# Patient Record
Sex: MALE | Race: WHITE | NOT HISPANIC OR LATINO | URBAN - METROPOLITAN AREA
[De-identification: names, ages, dates, MRNs, and addresses within clinical notes are randomized per-mention and may not be internally consistent; named-entity substitution may affect disease eponyms.]

---

## 2017-06-14 ENCOUNTER — EMERGENCY (EMERGENCY)
Facility: HOSPITAL | Age: 52
LOS: 0 days | Discharge: HOME | End: 2017-06-14

## 2017-06-14 DIAGNOSIS — Z87.891 PERSONAL HISTORY OF NICOTINE DEPENDENCE: ICD-10-CM

## 2017-06-14 DIAGNOSIS — M25.561 PAIN IN RIGHT KNEE: ICD-10-CM

## 2017-06-14 DIAGNOSIS — M25.461 EFFUSION, RIGHT KNEE: ICD-10-CM

## 2018-03-19 ENCOUNTER — INPATIENT (INPATIENT)
Facility: HOSPITAL | Age: 53
LOS: 3 days | Discharge: HOME | End: 2018-03-23
Attending: SURGERY

## 2018-03-19 VITALS
TEMPERATURE: 99 F | DIASTOLIC BLOOD PRESSURE: 74 MMHG | HEART RATE: 96 BPM | WEIGHT: 192.9 LBS | RESPIRATION RATE: 17 BRPM | SYSTOLIC BLOOD PRESSURE: 144 MMHG | OXYGEN SATURATION: 98 %

## 2018-03-19 LAB
APPEARANCE UR: CLEAR — SIGNIFICANT CHANGE UP
BASOPHILS # BLD AUTO: 0.09 K/UL — SIGNIFICANT CHANGE UP (ref 0–0.2)
BASOPHILS NFR BLD AUTO: 0.6 % — SIGNIFICANT CHANGE UP (ref 0–1)
BILIRUB UR-MCNC: NEGATIVE — SIGNIFICANT CHANGE UP
COLOR SPEC: YELLOW — SIGNIFICANT CHANGE UP
DIFF PNL FLD: NEGATIVE — SIGNIFICANT CHANGE UP
EOSINOPHIL # BLD AUTO: 0.09 K/UL — SIGNIFICANT CHANGE UP (ref 0–0.7)
EOSINOPHIL NFR BLD AUTO: 0.6 % — SIGNIFICANT CHANGE UP (ref 0–8)
GLUCOSE UR QL: NEGATIVE MG/DL — SIGNIFICANT CHANGE UP
HCT VFR BLD CALC: 45.1 % — SIGNIFICANT CHANGE UP (ref 42–52)
HGB BLD-MCNC: 15.9 G/DL — SIGNIFICANT CHANGE UP (ref 14–18)
IMM GRANULOCYTES NFR BLD AUTO: 0.4 % — HIGH (ref 0.1–0.3)
KETONES UR-MCNC: NEGATIVE — SIGNIFICANT CHANGE UP
LEUKOCYTE ESTERASE UR-ACNC: NEGATIVE — SIGNIFICANT CHANGE UP
LYMPHOCYTES # BLD AUTO: 17.8 % — LOW (ref 20.5–51.1)
LYMPHOCYTES # BLD AUTO: 2.67 K/UL — SIGNIFICANT CHANGE UP (ref 1.2–3.4)
MCHC RBC-ENTMCNC: 28.3 PG — SIGNIFICANT CHANGE UP (ref 27–31)
MCHC RBC-ENTMCNC: 35.3 G/DL — SIGNIFICANT CHANGE UP (ref 32–37)
MCV RBC AUTO: 80.2 FL — SIGNIFICANT CHANGE UP (ref 80–94)
MONOCYTES # BLD AUTO: 1.23 K/UL — HIGH (ref 0.1–0.6)
MONOCYTES NFR BLD AUTO: 8.2 % — SIGNIFICANT CHANGE UP (ref 1.7–9.3)
NEUTROPHILS # BLD AUTO: 10.85 K/UL — HIGH (ref 1.4–6.5)
NEUTROPHILS NFR BLD AUTO: 72.4 % — SIGNIFICANT CHANGE UP (ref 42.2–75.2)
NITRITE UR-MCNC: NEGATIVE — SIGNIFICANT CHANGE UP
NRBC # BLD: 0 /100 WBCS — SIGNIFICANT CHANGE UP (ref 0–0)
PH UR: 6 — SIGNIFICANT CHANGE UP (ref 5–8)
PLATELET # BLD AUTO: 201 K/UL — SIGNIFICANT CHANGE UP (ref 130–400)
PROT UR-MCNC: NEGATIVE MG/DL — SIGNIFICANT CHANGE UP
RBC # BLD: 5.62 M/UL — SIGNIFICANT CHANGE UP (ref 4.7–6.1)
RBC # FLD: 13.6 % — SIGNIFICANT CHANGE UP (ref 11.5–14.5)
SP GR SPEC: 1.01 — SIGNIFICANT CHANGE UP (ref 1.01–1.03)
UROBILINOGEN FLD QL: 0.2 MG/DL — SIGNIFICANT CHANGE UP (ref 0.2–0.2)
WBC # BLD: 14.99 K/UL — HIGH (ref 4.8–10.8)
WBC # FLD AUTO: 14.99 K/UL — HIGH (ref 4.8–10.8)

## 2018-03-19 RX ORDER — SODIUM CHLORIDE 9 MG/ML
1000 INJECTION INTRAMUSCULAR; INTRAVENOUS; SUBCUTANEOUS ONCE
Qty: 0 | Refills: 0 | Status: COMPLETED | OUTPATIENT
Start: 2018-03-19 | End: 2018-03-19

## 2018-03-19 RX ORDER — MORPHINE SULFATE 50 MG/1
4 CAPSULE, EXTENDED RELEASE ORAL ONCE
Qty: 0 | Refills: 0 | Status: DISCONTINUED | OUTPATIENT
Start: 2018-03-19 | End: 2018-03-19

## 2018-03-19 RX ADMIN — SODIUM CHLORIDE 2000 MILLILITER(S): 9 INJECTION INTRAMUSCULAR; INTRAVENOUS; SUBCUTANEOUS at 23:33

## 2018-03-19 RX ADMIN — MORPHINE SULFATE 4 MILLIGRAM(S): 50 CAPSULE, EXTENDED RELEASE ORAL at 23:32

## 2018-03-19 NOTE — ED ADULT TRIAGE NOTE - CHIEF COMPLAINT QUOTE
Diffuse abdominal pain for few days, pt has a history of diverticulitis, denies nausea or vomiting, no fever.

## 2018-03-20 LAB
ALBUMIN SERPL ELPH-MCNC: 4 G/DL — SIGNIFICANT CHANGE UP (ref 3.5–5.2)
ALP SERPL-CCNC: 77 U/L — SIGNIFICANT CHANGE UP (ref 30–115)
ALT FLD-CCNC: 15 U/L — SIGNIFICANT CHANGE UP (ref 0–41)
ANION GAP SERPL CALC-SCNC: 14 MMOL/L — SIGNIFICANT CHANGE UP (ref 7–14)
APTT BLD: 31.5 SEC — SIGNIFICANT CHANGE UP (ref 27–39.2)
AST SERPL-CCNC: 14 U/L — SIGNIFICANT CHANGE UP (ref 0–41)
BILIRUB DIRECT SERPL-MCNC: 0.3 MG/DL — HIGH (ref 0–0.2)
BILIRUB INDIRECT FLD-MCNC: 1 MG/DL — SIGNIFICANT CHANGE UP (ref 0.2–1.2)
BILIRUB SERPL-MCNC: 1.3 MG/DL — HIGH (ref 0.2–1.2)
BILIRUB SERPL-MCNC: 1.3 MG/DL — HIGH (ref 0.2–1.2)
BLD GP AB SCN SERPL QL: SIGNIFICANT CHANGE UP
BUN SERPL-MCNC: 13 MG/DL — SIGNIFICANT CHANGE UP (ref 10–20)
CALCIUM SERPL-MCNC: 9.2 MG/DL — SIGNIFICANT CHANGE UP (ref 8.5–10.1)
CHLORIDE SERPL-SCNC: 96 MMOL/L — LOW (ref 98–110)
CO2 SERPL-SCNC: 26 MMOL/L — SIGNIFICANT CHANGE UP (ref 17–32)
CREAT SERPL-MCNC: 1.2 MG/DL — SIGNIFICANT CHANGE UP (ref 0.7–1.5)
GLUCOSE SERPL-MCNC: 101 MG/DL — SIGNIFICANT CHANGE UP (ref 70–110)
INR BLD: 1.24 RATIO — SIGNIFICANT CHANGE UP (ref 0.65–1.3)
LACTATE SERPL-SCNC: 1 MMOL/L — SIGNIFICANT CHANGE UP (ref 0.5–2.2)
LIDOCAIN IGE QN: 26 U/L — SIGNIFICANT CHANGE UP (ref 7–60)
POTASSIUM SERPL-MCNC: 4.1 MMOL/L — SIGNIFICANT CHANGE UP (ref 3.5–5)
POTASSIUM SERPL-SCNC: 4.1 MMOL/L — SIGNIFICANT CHANGE UP (ref 3.5–5)
PROT SERPL-MCNC: 7 G/DL — SIGNIFICANT CHANGE UP (ref 6–8)
PROTHROM AB SERPL-ACNC: 13.5 SEC — HIGH (ref 9.95–12.87)
SODIUM SERPL-SCNC: 136 MMOL/L — SIGNIFICANT CHANGE UP (ref 135–146)
TYPE + AB SCN PNL BLD: SIGNIFICANT CHANGE UP

## 2018-03-20 RX ORDER — MORPHINE SULFATE 50 MG/1
2 CAPSULE, EXTENDED RELEASE ORAL EVERY 6 HOURS
Qty: 0 | Refills: 0 | Status: DISCONTINUED | OUTPATIENT
Start: 2018-03-20 | End: 2018-03-23

## 2018-03-20 RX ORDER — CIPROFLOXACIN LACTATE 400MG/40ML
VIAL (ML) INTRAVENOUS
Qty: 0 | Refills: 0 | Status: DISCONTINUED | OUTPATIENT
Start: 2018-03-20 | End: 2018-03-23

## 2018-03-20 RX ORDER — ONDANSETRON 8 MG/1
4 TABLET, FILM COATED ORAL EVERY 6 HOURS
Qty: 0 | Refills: 0 | Status: DISCONTINUED | OUTPATIENT
Start: 2018-03-20 | End: 2018-03-23

## 2018-03-20 RX ORDER — MORPHINE SULFATE 50 MG/1
4 CAPSULE, EXTENDED RELEASE ORAL ONCE
Qty: 0 | Refills: 0 | Status: DISCONTINUED | OUTPATIENT
Start: 2018-03-20 | End: 2018-03-20

## 2018-03-20 RX ORDER — CIPROFLOXACIN LACTATE 400MG/40ML
400 VIAL (ML) INTRAVENOUS EVERY 12 HOURS
Qty: 0 | Refills: 0 | Status: DISCONTINUED | OUTPATIENT
Start: 2018-03-20 | End: 2018-03-23

## 2018-03-20 RX ORDER — SODIUM CHLORIDE 9 MG/ML
1000 INJECTION, SOLUTION INTRAVENOUS
Qty: 0 | Refills: 0 | Status: DISCONTINUED | OUTPATIENT
Start: 2018-03-20 | End: 2018-03-21

## 2018-03-20 RX ORDER — HEPARIN SODIUM 5000 [USP'U]/ML
5000 INJECTION INTRAVENOUS; SUBCUTANEOUS EVERY 8 HOURS
Qty: 0 | Refills: 0 | Status: DISCONTINUED | OUTPATIENT
Start: 2018-03-20 | End: 2018-03-23

## 2018-03-20 RX ORDER — CIPROFLOXACIN LACTATE 400MG/40ML
400 VIAL (ML) INTRAVENOUS ONCE
Qty: 0 | Refills: 0 | Status: COMPLETED | OUTPATIENT
Start: 2018-03-20 | End: 2018-03-20

## 2018-03-20 RX ORDER — MORPHINE SULFATE 50 MG/1
4 CAPSULE, EXTENDED RELEASE ORAL EVERY 6 HOURS
Qty: 0 | Refills: 0 | Status: DISCONTINUED | OUTPATIENT
Start: 2018-03-20 | End: 2018-03-20

## 2018-03-20 RX ORDER — SODIUM CHLORIDE 9 MG/ML
1000 INJECTION INTRAMUSCULAR; INTRAVENOUS; SUBCUTANEOUS ONCE
Qty: 0 | Refills: 0 | Status: COMPLETED | OUTPATIENT
Start: 2018-03-20 | End: 2018-03-20

## 2018-03-20 RX ORDER — METRONIDAZOLE 500 MG
500 TABLET ORAL EVERY 8 HOURS
Qty: 0 | Refills: 0 | Status: DISCONTINUED | OUTPATIENT
Start: 2018-03-20 | End: 2018-03-20

## 2018-03-20 RX ORDER — PANTOPRAZOLE SODIUM 20 MG/1
40 TABLET, DELAYED RELEASE ORAL ONCE
Qty: 0 | Refills: 0 | Status: COMPLETED | OUTPATIENT
Start: 2018-03-20 | End: 2018-03-20

## 2018-03-20 RX ORDER — METRONIDAZOLE 500 MG
500 TABLET ORAL EVERY 8 HOURS
Qty: 0 | Refills: 0 | Status: DISCONTINUED | OUTPATIENT
Start: 2018-03-20 | End: 2018-03-23

## 2018-03-20 RX ADMIN — HEPARIN SODIUM 5000 UNIT(S): 5000 INJECTION INTRAVENOUS; SUBCUTANEOUS at 06:45

## 2018-03-20 RX ADMIN — MORPHINE SULFATE 4 MILLIGRAM(S): 50 CAPSULE, EXTENDED RELEASE ORAL at 09:18

## 2018-03-20 RX ADMIN — MORPHINE SULFATE 2 MILLIGRAM(S): 50 CAPSULE, EXTENDED RELEASE ORAL at 17:00

## 2018-03-20 RX ADMIN — MORPHINE SULFATE 2 MILLIGRAM(S): 50 CAPSULE, EXTENDED RELEASE ORAL at 20:46

## 2018-03-20 RX ADMIN — MORPHINE SULFATE 4 MILLIGRAM(S): 50 CAPSULE, EXTENDED RELEASE ORAL at 02:11

## 2018-03-20 RX ADMIN — Medication 100 MILLIGRAM(S): at 05:41

## 2018-03-20 RX ADMIN — MORPHINE SULFATE 4 MILLIGRAM(S): 50 CAPSULE, EXTENDED RELEASE ORAL at 10:37

## 2018-03-20 RX ADMIN — Medication 100 MILLIGRAM(S): at 14:18

## 2018-03-20 RX ADMIN — MORPHINE SULFATE 4 MILLIGRAM(S): 50 CAPSULE, EXTENDED RELEASE ORAL at 06:45

## 2018-03-20 RX ADMIN — Medication 200 MILLIGRAM(S): at 19:21

## 2018-03-20 RX ADMIN — MORPHINE SULFATE 2 MILLIGRAM(S): 50 CAPSULE, EXTENDED RELEASE ORAL at 16:19

## 2018-03-20 RX ADMIN — Medication 200 MILLIGRAM(S): at 03:55

## 2018-03-20 RX ADMIN — HEPARIN SODIUM 5000 UNIT(S): 5000 INJECTION INTRAVENOUS; SUBCUTANEOUS at 13:42

## 2018-03-20 RX ADMIN — MORPHINE SULFATE 4 MILLIGRAM(S): 50 CAPSULE, EXTENDED RELEASE ORAL at 03:54

## 2018-03-20 RX ADMIN — Medication 100 MILLIGRAM(S): at 13:42

## 2018-03-20 RX ADMIN — HEPARIN SODIUM 5000 UNIT(S): 5000 INJECTION INTRAVENOUS; SUBCUTANEOUS at 14:17

## 2018-03-20 RX ADMIN — PANTOPRAZOLE SODIUM 40 MILLIGRAM(S): 20 TABLET, DELAYED RELEASE ORAL at 06:45

## 2018-03-20 RX ADMIN — SODIUM CHLORIDE 125 MILLILITER(S): 9 INJECTION, SOLUTION INTRAVENOUS at 09:23

## 2018-03-20 RX ADMIN — SODIUM CHLORIDE 2000 MILLILITER(S): 9 INJECTION INTRAMUSCULAR; INTRAVENOUS; SUBCUTANEOUS at 03:55

## 2018-03-20 NOTE — ED PROVIDER NOTE - NS ED ROS FT
Constitutional: No fever, chills, unintended weight loss.  Eyes:  No visual changes, eye pain or discharge.  ENMT:  No hearing changes, pain, no sore throat or runny nose, no difficulty swallowing  Cardiac:  No chest pain, SOB or edema. No chest pain with exertion.  Respiratory:  No cough or respiratory distress. No hemoptysis. No history of asthma or RAD.  GI:  see hpi  :  No dysuria, frequency or burning.  MS:  No myalgia, muscle weakness, joint pain or back pain.  Neuro:  No headache or weakness.  No LOC.  Skin:  No skin rash.   Endocrine: No history of thyroid disease or diabetes.

## 2018-03-20 NOTE — ED PROVIDER NOTE - PHYSICAL EXAMINATION
VITAL SIGNS: I have reviewed nursing notes and confirm.  CONSTITUTIONAL: Well-developed; well-nourished; in no acute distress.  SKIN: Skin exam is warm and dry, no acute rash.  HEAD: Normocephalic; atraumatic.  EYES: PERRL, EOM intact; conjunctiva and sclera clear.  ENT: No nasal discharge; airway clear. TMs clear.  NECK: Supple; non tender.  CARD: S1, S2 normal; no murmurs, gallops, or rubs. Regular rate and rhythm.  RESP: No wheezes, rales or rhonchi.  ABD: Normal bowel sounds; soft; non-distended; +lower abd ttp; no hepatosplenomegaly; no rgr.  BACK: no cvat  EXT: Normal ROM. No clubbing, cyanosis or edema.  LYMPH: No acute cervical adenopathy.  NEURO: Alert, oriented. Grossly unremarkable. No focal deficits.  PSYCH: Cooperative, appropriate.

## 2018-03-20 NOTE — H&P ADULT - ASSESSMENT
53 yo M with perforated sigmoid diverticulitis    Plan  Nonoperative mngt  Serial abdominal exams  IV cipro/flagyl  IVF hydration  NPO  Evaluated with Dr. Read 51 yo M with perforated sigmoid diverticulitis with local peritonitis    Plan  Nonoperative management for now  Serial abdominal exams  IV cipro/flagyl  IVF hydration  NPO  Evaluated with Dr. Read

## 2018-03-20 NOTE — ED PROVIDER NOTE - OBJECTIVE STATEMENT
51yo m h/o diverticulitis no surg p/w abd pain x 2d. Rpts feeling unwell a few days ago, described as generalized weakness and myalgias, followed by constant lower abd pain over last 2d. Aching, non-radiating, no assoc sx. Denies f/c, cp/sob, nvd, melena, brbpr, urinary sx, rash. GI  ?Vega

## 2018-03-20 NOTE — H&P ADULT - HISTORY OF PRESENT ILLNESS
52M with known PMH of diverticulitis presents to the ED with three days of abdominal pain. Patient states that pain felt similar ot episode he had 2 years ago. He has had chills over the weekend and came to the ED due to persistence of these symptoms. Denies any nausea or signs of bowel obstruction 52M with known PMH of diverticulitis presents to the ED with three days of abdominal pain. Patient states that pain felt similar ot episode he had 2 years ago. He has had chills over the weekend and came to the ED due to persistence of these symptoms. Denies any nausea or vomitting, no fever.    ALL: NKDA  PMH: noncomplicated diverticulitis  PSH: colonoscopy 2 years ago - polypectomy 52M with known PMH of diverticulitis presents to the ED with three days of abdominal pain. Patient states that pain felt similar ot episode he had 2 years ago. He has had chills over the weekend and came to the ED due to persistence of these symptoms. Denies any nausea or vomiting no fever. Normal BM 24 hours ago    ALL: NKDA  PMH: noncomplicated diverticulitis  PSH: colonoscopy 2 years ago - polypectomy

## 2018-03-20 NOTE — H&P ADULT - NSHPLABSRESULTS_GEN_ALL_CORE
15.9   14.99 )-----------( 201      ( 19 Mar 2018 23:28 )             45.1   03-19    136  |  96<L>  |  13  ----------------------------<  101  4.1   |  26  |  1.2    Ca    9.2      19 Mar 2018 23:28    TPro  7.0  /  Alb  4.0  /  TBili  1.3<H>  /  DBili  0.3<H>  /  AST  14  /  ALT  15  /  AlkPhos  77  03-19    Lactate 1.0    < from: CT Abdomen and Pelvis w/ IV Cont (03.20.18 @ 01:49) >    IMPRESSION:  Acute sigmoid diverticulitis with phlegmonous changes and free   intraperitoneal air; no definite focal drainable fluid collection or   abscess.     < end of copied text >

## 2018-03-20 NOTE — H&P ADULT - NSHPPHYSICALEXAM_GEN_ALL_CORE
Gen: a&ox3  Lungs: clear b/l  Abd: soft, ND, LLQ and RLQ focal tenderness Gen: a&ox3 sitting comfortable in chair when seen initially  Lungs: clear b/l  Abd: soft, ND, LLQ and RLQ focal tenderness, Positive percussion tenderness in lower abdomen also pain on cough in the same area

## 2018-03-20 NOTE — H&P ADULT - NSHPSOCIALHISTORY_GEN_ALL_CORE
no alcohol, no smoking, no drugs Works in [employer]   no alcohol, Past smoker , no drugs

## 2018-03-20 NOTE — ED ADULT NURSE NOTE - OBJECTIVE STATEMENT
pt c/o lower abd pain, states hx of diverticulitis states this feels the same. denies NVD, CP or SHOB. A+Ox4, ambulatory w.o assist.

## 2018-03-20 NOTE — ED PROVIDER NOTE - PROGRESS NOTE DETAILS
rec'd call from Radiology res, CT AP showing sigmoid diverticulitis w/free air, no abscess - Surgery Dr. Huynh aware, will see pt now in ED per surgery team no OR, iv abx, admit to Dr. Read

## 2018-03-20 NOTE — H&P ADULT - ATTENDING COMMENTS
Patient seen and examined with surgery team on rounds and agree with management plan. CT scans were reviewed including prior one from 2016. Discussed management with patient and his daughter who works in the hospital

## 2018-03-21 DIAGNOSIS — K57.32 DIVERTICULITIS OF LARGE INTESTINE WITHOUT PERFORATION OR ABSCESS WITHOUT BLEEDING: ICD-10-CM

## 2018-03-21 PROBLEM — Z00.00 ENCOUNTER FOR PREVENTIVE HEALTH EXAMINATION: Status: ACTIVE | Noted: 2018-03-21

## 2018-03-21 LAB
ANION GAP SERPL CALC-SCNC: 12 MMOL/L — SIGNIFICANT CHANGE UP (ref 7–14)
BASOPHILS # BLD AUTO: 0.05 K/UL — SIGNIFICANT CHANGE UP (ref 0–0.2)
BASOPHILS NFR BLD AUTO: 0.4 % — SIGNIFICANT CHANGE UP (ref 0–1)
BUN SERPL-MCNC: 10 MG/DL — SIGNIFICANT CHANGE UP (ref 10–20)
CALCIUM SERPL-MCNC: 8.8 MG/DL — SIGNIFICANT CHANGE UP (ref 8.5–10.1)
CHLORIDE SERPL-SCNC: 94 MMOL/L — LOW (ref 98–110)
CO2 SERPL-SCNC: 28 MMOL/L — SIGNIFICANT CHANGE UP (ref 17–32)
CREAT SERPL-MCNC: 1.1 MG/DL — SIGNIFICANT CHANGE UP (ref 0.7–1.5)
CULTURE RESULTS: NO GROWTH — SIGNIFICANT CHANGE UP
EOSINOPHIL # BLD AUTO: 0.08 K/UL — SIGNIFICANT CHANGE UP (ref 0–0.7)
EOSINOPHIL NFR BLD AUTO: 0.7 % — SIGNIFICANT CHANGE UP (ref 0–8)
GLUCOSE SERPL-MCNC: 78 MG/DL — SIGNIFICANT CHANGE UP (ref 70–110)
HCT VFR BLD CALC: 40.8 % — LOW (ref 42–52)
HGB BLD-MCNC: 14.2 G/DL — SIGNIFICANT CHANGE UP (ref 14–18)
IMM GRANULOCYTES NFR BLD AUTO: 0.6 % — HIGH (ref 0.1–0.3)
LYMPHOCYTES # BLD AUTO: 1.19 K/UL — LOW (ref 1.2–3.4)
LYMPHOCYTES # BLD AUTO: 10.6 % — LOW (ref 20.5–51.1)
MCHC RBC-ENTMCNC: 28.1 PG — SIGNIFICANT CHANGE UP (ref 27–31)
MCHC RBC-ENTMCNC: 34.8 G/DL — SIGNIFICANT CHANGE UP (ref 32–37)
MCV RBC AUTO: 80.6 FL — SIGNIFICANT CHANGE UP (ref 80–94)
MONOCYTES # BLD AUTO: 0.96 K/UL — HIGH (ref 0.1–0.6)
MONOCYTES NFR BLD AUTO: 8.6 % — SIGNIFICANT CHANGE UP (ref 1.7–9.3)
NEUTROPHILS # BLD AUTO: 8.84 K/UL — HIGH (ref 1.4–6.5)
NEUTROPHILS NFR BLD AUTO: 79.1 % — HIGH (ref 42.2–75.2)
NRBC # BLD: 0 /100 WBCS — SIGNIFICANT CHANGE UP (ref 0–0)
PLATELET # BLD AUTO: 162 K/UL — SIGNIFICANT CHANGE UP (ref 130–400)
POTASSIUM SERPL-MCNC: 4.1 MMOL/L — SIGNIFICANT CHANGE UP (ref 3.5–5)
POTASSIUM SERPL-SCNC: 4.1 MMOL/L — SIGNIFICANT CHANGE UP (ref 3.5–5)
RBC # BLD: 5.06 M/UL — SIGNIFICANT CHANGE UP (ref 4.7–6.1)
RBC # FLD: 13.2 % — SIGNIFICANT CHANGE UP (ref 11.5–14.5)
SODIUM SERPL-SCNC: 134 MMOL/L — LOW (ref 135–146)
SPECIMEN SOURCE: SIGNIFICANT CHANGE UP
WBC # BLD: 11.19 K/UL — HIGH (ref 4.8–10.8)
WBC # FLD AUTO: 11.19 K/UL — HIGH (ref 4.8–10.8)

## 2018-03-21 RX ADMIN — HEPARIN SODIUM 5000 UNIT(S): 5000 INJECTION INTRAVENOUS; SUBCUTANEOUS at 05:43

## 2018-03-21 RX ADMIN — MORPHINE SULFATE 2 MILLIGRAM(S): 50 CAPSULE, EXTENDED RELEASE ORAL at 06:27

## 2018-03-21 RX ADMIN — HEPARIN SODIUM 5000 UNIT(S): 5000 INJECTION INTRAVENOUS; SUBCUTANEOUS at 13:13

## 2018-03-21 RX ADMIN — HEPARIN SODIUM 5000 UNIT(S): 5000 INJECTION INTRAVENOUS; SUBCUTANEOUS at 21:59

## 2018-03-21 RX ADMIN — Medication 100 MILLIGRAM(S): at 21:58

## 2018-03-21 RX ADMIN — SODIUM CHLORIDE 125 MILLILITER(S): 9 INJECTION, SOLUTION INTRAVENOUS at 00:04

## 2018-03-21 RX ADMIN — Medication 200 MILLIGRAM(S): at 17:53

## 2018-03-21 RX ADMIN — Medication 100 MILLIGRAM(S): at 05:44

## 2018-03-21 RX ADMIN — MORPHINE SULFATE 2 MILLIGRAM(S): 50 CAPSULE, EXTENDED RELEASE ORAL at 06:42

## 2018-03-21 RX ADMIN — Medication 100 MILLIGRAM(S): at 13:13

## 2018-03-21 RX ADMIN — MORPHINE SULFATE 2 MILLIGRAM(S): 50 CAPSULE, EXTENDED RELEASE ORAL at 18:52

## 2018-03-21 RX ADMIN — MORPHINE SULFATE 2 MILLIGRAM(S): 50 CAPSULE, EXTENDED RELEASE ORAL at 17:53

## 2018-03-21 RX ADMIN — Medication 200 MILLIGRAM(S): at 06:22

## 2018-03-21 NOTE — PROGRESS NOTE ADULT - ASSESSMENT
51 Y/O MALE W/ RECURRENT DIVERTICULITS, TREATED NON OPERATIVELY W/ IVF, NPO, ABX. IMPROVING PHYSICAL EXAM, LESS TENDER.     PLAN: ADVANCE TO CLEARS AM, FULLS FOR LUNCH, SOFT DIET TOMORROW IF TOLERATING.  CONTINUE ABX  IVL  SERIAL ABD EXAMS  F/U LABS  F/U VITALS

## 2018-03-21 NOTE — PROGRESS NOTE ADULT - SUBJECTIVE AND OBJECTIVE BOX
PATRICIA CHE  52y Male   [id number]    Hospital Day: 2  Procedure/dx: perforated diverticulitis  Patient is a 52y old  Male who presents with a chief complaint of Abdominal pain (20 Mar 2018 04:59)  Events of the Last 24h:      Vital Signs Last 24 Hrs  T(C): 37.6 (20 Mar 2018 22:01), Max: 37.6 (20 Mar 2018 22:01)  T(F): 99.6 (20 Mar 2018 22:01), Max: 99.6 (20 Mar 2018 22:01)  HR: 84 (20 Mar 2018 22:01) (74 - 85)  BP: 132/72 (20 Mar 2018 22:01) (121/77 - 133/71)  BP(mean): --  RR: 18 (20 Mar 2018 22:01) (17 - 18)  SpO2: 100% (20 Mar 2018 19:26) (100% - 100%)            I&O's Summary   I&O's Detail      MEDICATIONS  (STANDING):  ciprofloxacin   IVPB 400 milliGRAM(s) IV Intermittent every 12 hours  ciprofloxacin   IVPB      heparin  Injectable 5000 Unit(s) SubCutaneous every 8 hours  lactated ringers. 1000 milliLiter(s) (125 mL/Hr) IV Continuous <Continuous>  metroNIDAZOLE  IVPB 500 milliGRAM(s) IV Intermittent every 8 hours    MEDICATIONS  (PRN):  morphine  - Injectable 2 milliGRAM(s) IV Push every 6 hours PRN Moderate Pain (4 - 6)  ondansetron Injectable 4 milliGRAM(s) IV Push every 6 hours PRN Nausea                          15.9   14.99 )-----------( 201      ( 19 Mar 2018 23:28 )             45.1        CBC Full  -  ( 19 Mar 2018 23:28 )  WBC Count : 14.99 K/uL  Hemoglobin : 15.9 g/dL  Hematocrit : 45.1 %  Platelet Count - Automated : 201 K/uL  Mean Cell Volume : 80.2 fL  Mean Cell Hemoglobin : 28.3 pg  Mean Cell Hemoglobin Concentration : 35.3 g/dL  Auto Neutrophil # : 10.85 K/uL  Auto Lymphocyte # : 2.67 K/uL  Auto Monocyte # : 1.23 K/uL  Auto Eosinophil # : 0.09 K/uL  Auto Basophil # : 0.09 K/uL  Auto Neutrophil % : 72.4 %  Auto Lymphocyte % : 17.8 %  Auto Monocyte % : 8.2 %  Auto Eosinophil % : 0.6 %  Auto Basophil % : 0.6 %               136   |  96    |  13                 Ca: 9.2    BMP:   ----------------------------< 101    Mg: x     (18 @ 23:28)             4.1    |  26    | 1.2                Ph: x        LFT:     TPro: 7.0 / Alb: 4.0 / TBili: 1.3 / DBili: 0.3 / AST: 14 / ALT: 15 / AlkPhos: 77   (18 @ 23:28)    LIVER FUNCTIONS - ( 19 Mar 2018 23:28 )  Alb: 4.0 g/dL / Pro: 7.0 g/dL / ALK PHOS: 77 U/L / ALT: 15 U/L / AST: 14 U/L / GGT: x           PT/INR - ( 20 Mar 2018 09:42 )   PT: 13.50 sec;   INR: 1.24 ratio         PTT - ( 20 Mar 2018 09:42 )  PTT:31.5 sec      Urinalysis Basic - ( 19 Mar 2018 23:28 )    Color: Yellow / Appearance: Clear / S.015 / pH: x  Gluc: x / Ketone: Negative  / Bili: Negative / Urobili: 0.2 mg/dL   Blood: x / Protein: Negative mg/dL / Nitrite: Negative   Leuk Esterase: Negative / RBC: x / WBC x   Sq Epi: x / Non Sq Epi: x / Bacteria: x          IMAGING: < from: CT Abdomen and Pelvis w/ IV Cont (18 @ 01:49) >  Acute sigmoid diverticulitis with phlegmonous changes and free   intraperitoneal air; no definite focal drainable fluid collection or   abscess.     < end of copied text >      SPECTRA: [id number] PAT[address] CHE  52y Male   [id number]    Hospital Day: 2  Procedure/dx: perforated diverticulitis  Patient is a 52y old  Male who presents with a chief complaint of Abdominal pain (20 Mar 2018 04:59)  Events of the Last 24h: abd pain improving, passing flatus, on IV abx, NPO      Vital Signs Last 24 Hrs  T(C): 37.6 (20 Mar 2018 22:01), Max: 37.6 (20 Mar 2018 22:01)  T(F): 99.6 (20 Mar 2018 22:01), Max: 99.6 (20 Mar 2018 22:01)  HR: 84 (20 Mar 2018 22:01) (74 - 85)  BP: 132/72 (20 Mar 2018 22:01) (121/77 - 133/71)  BP(mean): --  RR: 18 (20 Mar 2018 22:01) (17 - 18)  SpO2: 100% (20 Mar 2018 19:26) (100% - 100%)            I&O's Summary   I&O's Detail      MEDICATIONS  (STANDING):  ciprofloxacin   IVPB 400 milliGRAM(s) IV Intermittent every 12 hours  ciprofloxacin   IVPB      heparin  Injectable 5000 Unit(s) SubCutaneous every 8 hours  lactated ringers. 1000 milliLiter(s) (125 mL/Hr) IV Continuous <Continuous>  metroNIDAZOLE  IVPB 500 milliGRAM(s) IV Intermittent every 8 hours    MEDICATIONS  (PRN):  morphine  - Injectable 2 milliGRAM(s) IV Push every 6 hours PRN Moderate Pain (4 - 6)  ondansetron Injectable 4 milliGRAM(s) IV Push every 6 hours PRN Nausea    PHYSICAL EXAM:  GEN: NAD  HEENT: WNL  RESP: CTAB  CV: RRR  ABD: SOFT, NON DISTENDED, MILDLY TENDER LLQ AND SUPRAPUBIC REGION                        15.9   14.99 )-----------( 201      ( 19 Mar 2018 23:28 )             45.1        CBC Full  -  ( 19 Mar 2018 23:28 )  WBC Count : 14.99 K/uL  Hemoglobin : 15.9 g/dL  Hematocrit : 45.1 %  Platelet Count - Automated : 201 K/uL  Mean Cell Volume : 80.2 fL  Mean Cell Hemoglobin : 28.3 pg  Mean Cell Hemoglobin Concentration : 35.3 g/dL  Auto Neutrophil # : 10.85 K/uL  Auto Lymphocyte # : 2.67 K/uL  Auto Monocyte # : 1.23 K/uL  Auto Eosinophil # : 0.09 K/uL  Auto Basophil # : 0.09 K/uL  Auto Neutrophil % : 72.4 %  Auto Lymphocyte % : 17.8 %  Auto Monocyte % : 8.2 %  Auto Eosinophil % : 0.6 %  Auto Basophil % : 0.6 %               136   |  96    |  13                 Ca: 9.2    BMP:   ----------------------------< 101    Mg: x     (18 @ 23:28)             4.1    |  26    | 1.2                Ph: x        LFT:     TPro: 7.0 / Alb: 4.0 / TBili: 1.3 / DBili: 0.3 / AST: 14 / ALT: 15 / AlkPhos: 77   (18 @ 23:28)    LIVER FUNCTIONS - ( 19 Mar 2018 23:28 )  Alb: 4.0 g/dL / Pro: 7.0 g/dL / ALK PHOS: 77 U/L / ALT: 15 U/L / AST: 14 U/L / GGT: x           PT/INR - ( 20 Mar 2018 09:42 )   PT: 13.50 sec;   INR: 1.24 ratio         PTT - ( 20 Mar 2018 09:42 )  PTT:31.5 sec      Urinalysis Basic - ( 19 Mar 2018 23:28 )    Color: Yellow / Appearance: Clear / S.015 / pH: x  Gluc: x / Ketone: Negative  / Bili: Negative / Urobili: 0.2 mg/dL   Blood: x / Protein: Negative mg/dL / Nitrite: Negative   Leuk Esterase: Negative / RBC: x / WBC x   Sq Epi: x / Non Sq Epi: x / Bacteria: x          IMAGING: < from: CT Abdomen and Pelvis w/ IV Cont (18 @ 01:49) >  Acute sigmoid diverticulitis with phlegmonous changes and free   intraperitoneal air; no definite focal drainable fluid collection or   abscess.     < end of copied text >      SPECTRA: [id number] PATR[address] CHE  52y Male   [id number]    Hospital Day: 2  Procedure/dx: perforated diverticulitis  Patient is a 52y old  Male who presents with a chief complaint of Abdominal pain (20 Mar 2018 04:59)  Events of the Last 24h: abd pain improving, passing flatus, on IV abx, NPO      Vital Signs Last 24 Hrs  T(F): 99.6 (20 Mar 2018 22:01), Max: 99.6 (20 Mar 2018 22:01)  HR: 84 (20 Mar 2018 22:01) (74 - 85)  BP: 132/72 (20 Mar 2018 22:01) (121/77 - 133/71)  RR: 18 (20 Mar 2018 22:01) (17 - 18)  SpO2: 100% (20 Mar 2018 19:26) (100% - 100%)    MEDICATIONS  (STANDING):  ciprofloxacin   IVPB 400 milliGRAM(s) IV Intermittent every 12 hours  heparin  Injectable 5000 Unit(s) SubCutaneous every 8 hours  lactated ringers. 1000 milliLiter(s) (125 mL/Hr) IV Continuous <Continuous>  metroNIDAZOLE  IVPB 500 milliGRAM(s) IV Intermittent every 8 hours    MEDICATIONS  (PRN):  morphine  - Injectable 2 milliGRAM(s) IV Push every 6 hours PRN Moderate Pain (4 - 6)  ondansetron Injectable 4 milliGRAM(s) IV Push every 6 hours PRN Nausea    PHYSICAL EXAM:  GEN: NAD  HEENT: WNL  RESP: CTAB  CV: RRR  ABD: SOFT, NON DISTENDED, MILDLY TENDER LLQ AND SUPRAPUBIC REGION                        15.9   14.99 )-----------( 201      ( 19 Mar 2018 23:28 )             45.1        CBC Full  -  ( 19 Mar 2018 23:28 )  WBC Count : 14.99 K/uL  Hemoglobin : 15.9 g/dL  Hematocrit : 45.1 %  Platelet Count - Automated : 201 K/uL             136   |  96    |  13                 Ca: 9.2    BMP:   ----------------------------< 101    Mg: x     (18 @ 23:28)             4.1    |  26    | 1.2                Ph: x        LFT:     TPro: 7.0 / Alb: 4.0 / TBili: 1.3 / DBili: 0.3 / AST: 14 / ALT: 15 / AlkPhos: 77   (18 @ 23:28)    LIVER FUNCTIONS - ( 19 Mar 2018 23:28 )  Alb: 4.0 g/dL / Pro: 7.0 g/dL / ALK PHOS: 77 U/L / ALT: 15 U/L / AST: 14 U/L / GGT: x           PT/INR - ( 20 Mar 2018 09:42 )   PT: 13.50 sec;   INR: 1.24 ratio         PTT - ( 20 Mar 2018 09:42 )  PTT:31.5 sec      Urinalysis Basic - ( 19 Mar 2018 23:28 )    Color: Yellow / Appearance: Clear / S.015 / pH: x  Gluc: x / Ketone: Negative  / Bili: Negative / Urobili: 0.2 mg/dL   Blood: x / Protein: Negative mg/dL / Nitrite: Negative   Leuk Esterase: Negative / RBC: x / WBC x   Sq Epi: x / Non Sq Epi: x / Bacteria: x          IMAGING: < from: CT Abdomen and Pelvis w/ IV Cont (18 @ 01:49) >  Acute sigmoid diverticulitis with phlegmonous changes and free   intraperitoneal air; no definite focal drainable fluid collection or   abscess.     < end of copied text >      SPECTRA: [id number]

## 2018-03-22 LAB
ANION GAP SERPL CALC-SCNC: 11 MMOL/L — SIGNIFICANT CHANGE UP (ref 7–14)
BASOPHILS # BLD AUTO: 0.05 K/UL — SIGNIFICANT CHANGE UP (ref 0–0.2)
BASOPHILS NFR BLD AUTO: 0.7 % — SIGNIFICANT CHANGE UP (ref 0–1)
BUN SERPL-MCNC: 10 MG/DL — SIGNIFICANT CHANGE UP (ref 10–20)
CALCIUM SERPL-MCNC: 9.4 MG/DL — SIGNIFICANT CHANGE UP (ref 8.5–10.1)
CHLORIDE SERPL-SCNC: 95 MMOL/L — LOW (ref 98–110)
CO2 SERPL-SCNC: 30 MMOL/L — SIGNIFICANT CHANGE UP (ref 17–32)
CREAT SERPL-MCNC: 1.2 MG/DL — SIGNIFICANT CHANGE UP (ref 0.7–1.5)
EOSINOPHIL # BLD AUTO: 0.1 K/UL — SIGNIFICANT CHANGE UP (ref 0–0.7)
EOSINOPHIL NFR BLD AUTO: 1.3 % — SIGNIFICANT CHANGE UP (ref 0–8)
GLUCOSE SERPL-MCNC: 100 MG/DL — SIGNIFICANT CHANGE UP (ref 70–110)
HCT VFR BLD CALC: 45.8 % — SIGNIFICANT CHANGE UP (ref 42–52)
HGB BLD-MCNC: 15.9 G/DL — SIGNIFICANT CHANGE UP (ref 14–18)
IMM GRANULOCYTES NFR BLD AUTO: 0.3 % — SIGNIFICANT CHANGE UP (ref 0.1–0.3)
LYMPHOCYTES # BLD AUTO: 1.25 K/UL — SIGNIFICANT CHANGE UP (ref 1.2–3.4)
LYMPHOCYTES # BLD AUTO: 16.5 % — LOW (ref 20.5–51.1)
MAGNESIUM SERPL-MCNC: 2.3 MG/DL — SIGNIFICANT CHANGE UP (ref 1.8–2.4)
MCHC RBC-ENTMCNC: 28.1 PG — SIGNIFICANT CHANGE UP (ref 27–31)
MCHC RBC-ENTMCNC: 34.7 G/DL — SIGNIFICANT CHANGE UP (ref 32–37)
MCV RBC AUTO: 80.9 FL — SIGNIFICANT CHANGE UP (ref 80–94)
MONOCYTES # BLD AUTO: 0.62 K/UL — HIGH (ref 0.1–0.6)
MONOCYTES NFR BLD AUTO: 8.2 % — SIGNIFICANT CHANGE UP (ref 1.7–9.3)
NEUTROPHILS # BLD AUTO: 5.54 K/UL — SIGNIFICANT CHANGE UP (ref 1.4–6.5)
NEUTROPHILS NFR BLD AUTO: 73 % — SIGNIFICANT CHANGE UP (ref 42.2–75.2)
PHOSPHATE SERPL-MCNC: 3.2 MG/DL — SIGNIFICANT CHANGE UP (ref 2.1–4.9)
PLATELET # BLD AUTO: 189 K/UL — SIGNIFICANT CHANGE UP (ref 130–400)
POTASSIUM SERPL-MCNC: 4.4 MMOL/L — SIGNIFICANT CHANGE UP (ref 3.5–5)
POTASSIUM SERPL-SCNC: 4.4 MMOL/L — SIGNIFICANT CHANGE UP (ref 3.5–5)
RBC # BLD: 5.66 M/UL — SIGNIFICANT CHANGE UP (ref 4.7–6.1)
RBC # FLD: 13.1 % — SIGNIFICANT CHANGE UP (ref 11.5–14.5)
SODIUM SERPL-SCNC: 136 MMOL/L — SIGNIFICANT CHANGE UP (ref 135–146)
WBC # BLD: 7.58 K/UL — SIGNIFICANT CHANGE UP (ref 4.8–10.8)
WBC # FLD AUTO: 7.58 K/UL — SIGNIFICANT CHANGE UP (ref 4.8–10.8)

## 2018-03-22 RX ADMIN — Medication 200 MILLIGRAM(S): at 18:16

## 2018-03-22 RX ADMIN — Medication 100 MILLIGRAM(S): at 06:03

## 2018-03-22 RX ADMIN — Medication 100 MILLIGRAM(S): at 22:38

## 2018-03-22 RX ADMIN — HEPARIN SODIUM 5000 UNIT(S): 5000 INJECTION INTRAVENOUS; SUBCUTANEOUS at 22:38

## 2018-03-22 RX ADMIN — HEPARIN SODIUM 5000 UNIT(S): 5000 INJECTION INTRAVENOUS; SUBCUTANEOUS at 06:03

## 2018-03-22 RX ADMIN — HEPARIN SODIUM 5000 UNIT(S): 5000 INJECTION INTRAVENOUS; SUBCUTANEOUS at 13:40

## 2018-03-22 RX ADMIN — Medication 200 MILLIGRAM(S): at 06:03

## 2018-03-22 RX ADMIN — Medication 100 MILLIGRAM(S): at 13:40

## 2018-03-22 NOTE — PROGRESS NOTE ADULT - ASSESSMENT
51 Y/O MALE W/ RECURRENT DIVERTICULITS, TREATED NON OPERATIVELY W/ IVF, ABX. IMPROVING PHYSICAL EXAM, LESS TENDER. TOLERATING SOFT LOW RESIDUE DIET    PLAN: CONTINUE ABX  FOR D/C TOMORROW ON ORAL CIPRO/FLAGYL  ANTICIPATE FOR DISCHARGE

## 2018-03-22 NOTE — PROGRESS NOTE ADULT - SUBJECTIVE AND OBJECTIVE BOX
PATRICIA CHE  52y Male   [id number]    Hospital Day: 3  Post Operative Day:    Procedure/dx: DIVERTICULITS  Events of the Last 24h: NO ACUTE EVENTS    Patient is a 52y old  Male who presents with a chief complaint of perforated sigmoid colon, diverticulitis (21 Mar 2018 06:39)    PAST MEDICAL & SURGICAL HISTORY:      Vital Signs Last 24 Hrs  T(C): 36.1 (22 Mar 2018 07:30), Max: 36.7 (21 Mar 2018 16:05)  T(F): 96.9 (22 Mar 2018 07:30), Max: 98 (21 Mar 2018 16:05)  HR: 66 (22 Mar 2018 07:30) (66 - 80)  BP: 120/85 (22 Mar 2018 07:30) (120/75 - 125/73)  BP(mean): --  RR: 18 (22 Mar 2018 07:30) (18 - 18)  SpO2: --        Diet, Soft:   Fiber/Residue Restricted (18 @ 06:42)      I&O's Summary    21 Mar 2018 07:  -  22 Mar 2018 07:00  --------------------------------------------------------  IN: 440 mL / OUT: 0 mL / NET: 440 mL     I&O's Detail    21 Mar 2018 07:  -  22 Mar 2018 07:00  --------------------------------------------------------  IN:    IV PiggyBack: 200 mL    Oral Fluid: 240 mL  Total IN: 440 mL    OUT:  Total OUT: 0 mL    Total NET: 440 mL          MEDICATIONS  (STANDING):  ciprofloxacin   IVPB 400 milliGRAM(s) IV Intermittent every 12 hours  ciprofloxacin   IVPB      heparin  Injectable 5000 Unit(s) SubCutaneous every 8 hours  metroNIDAZOLE  IVPB 500 milliGRAM(s) IV Intermittent every 8 hours    MEDICATIONS  (PRN):  morphine  - Injectable 2 milliGRAM(s) IV Push every 6 hours PRN Moderate Pain (4 - 6)  ondansetron Injectable 4 milliGRAM(s) IV Push every 6 hours PRN Nausea      PHYSICAL EXAM:    GENERAL: NAD    HEENT: NCAT    CHEST/LUNGS: CTAB    HEART: RRR,  No murmurs, rubs, or gallops    ABDOMEN: SOFT, MILD SUPRAPUBIC/LLQ TENDERNESS +BS    EXTREMITIES:  FROM, No clubbing, cyanosis, or edema, palpable pulse    NEURO: No focal neurological deficits    SKIN: No rashes or lesions    INCISION/WOUNDS:                          15.9   7.58  )-----------( 189      ( 22 Mar 2018 07:23 )             45.8        CBC Full  -  ( 22 Mar 2018 07:23 )  WBC Count : 7.58 K/uL  Hemoglobin : 15.9 g/dL  Hematocrit : 45.8 %  Platelet Count - Automated : 189 K/uL  Mean Cell Volume : 80.9 fL  Mean Cell Hemoglobin : 28.1 pg  Mean Cell Hemoglobin Concentration : 34.7 g/dL  Auto Neutrophil # : 5.54 K/uL  Auto Lymphocyte # : 1.25 K/uL  Auto Monocyte # : 0.62 K/uL  Auto Eosinophil # : 0.10 K/uL  Auto Basophil # : 0.05 K/uL  Auto Neutrophil % : 73.0 %  Auto Lymphocyte % : 16.5 %  Auto Monocyte % : 8.2 %  Auto Eosinophil % : 1.3 %  Auto Basophil % : 0.7 %               136   |  95    |  10                 Ca: 9.4    BMP:   ----------------------------< 100    M.3   (18 @ 07:23)             4.4    |  30    | 1.2                Ph: 3.2      LFT:     TPro: 7.0 / Alb: 4.0 / TBili: 1.3 / DBili: 0.3 / AST: 14 / ALT: 15 / AlkPhos: 77   (18 @ 23:28)                Culture - Urine (collected 19 Mar 2018 23:28)  Source: .Urine Clean Catch (Midstream)  Final Report (21 Mar 2018 11:27):    No growth        IMAGING: < from: CT Abdomen and Pelvis w/ IV Cont (18 @ 01:49) >  Acute sigmoid diverticulitis with phlegmonous changes and free   intraperitoneal air; no definite focal drainable fluid collection or   abscess.     < end of copied text >      PATHOLOGY:      SPECTRA: [id number] PATRIC[address] CHE  52y Male   [id number]    Hospital Day: 3  Procedure/dx: DIVERTICULITS  Events of the Last 24h: NO ACUTE EVENTS    Patient is a 52y old  Male who presents with a chief complaint of perforated sigmoid colon, diverticulitis (21 Mar 2018 06:39)  Vital Signs Last 24 Hrs  T(F): 96.9 (22 Mar 2018 07:30), Max: 98 (21 Mar 2018 16:05)  HR: 66 (22 Mar 2018 07:30) (66 - 80)  BP: 120/85 (22 Mar 2018 07:30) (120/75 - 125/73)  RR: 18 (22 Mar 2018 07:30) (18 - 18)  Diet, Soft:   Fiber/Residue Restricted (18 @ 06:42)    MEDICATIONS  (STANDING):  ciprofloxacin   IVPB 400 milliGRAM(s) IV Intermittent every 12 hours  heparin  Injectable 5000 Unit(s) SubCutaneous every 8 hours  metroNIDAZOLE  IVPB 500 milliGRAM(s) IV Intermittent every 8 hours    MEDICATIONS  (PRN):  morphine  - Injectable 2 milliGRAM(s) IV Push every 6 hours PRN Moderate Pain (4 - 6)  ondansetron Injectable 4 milliGRAM(s) IV Push every 6 hours PRN Nausea      PHYSICAL EXAM:    GENERAL: NAD    HEENT: NCAT    CHEST/LUNGS: CTAB    HEART: RRR,  No murmurs, rubs, or gallops    ABDOMEN: SOFT, MILD SUPRAPUBIC/LLQ TENDERNESS +BS    EXTREMITIES:  FROM, No clubbing, cyanosis, or edema, palpable pulse    NEURO: No focal neurological deficits    SKIN: No rashes or lesions                        15.9   7.58  )-----------( 189      ( 22 Mar 2018 07:23 )             45.8        CBC Full  -  ( 22 Mar 2018 07:23 )  WBC Count : 7.58 K/uL  Hemoglobin : 15.9 g/dL  Hematocrit : 45.8 %  Platelet Count - Automated : 189 K/uL             136   |  95    |  10                 Ca: 9.4    BMP:   ----------------------------< 100    M.3   (18 @ 07:23)             4.4    |  30    | 1.2                Ph: 3.2      LFT:     TPro: 7.0 / Alb: 4.0 / TBili: 1.3 / DBili: 0.3 / AST: 14 / ALT: 15 / AlkPhos: 77   (18 @ 23:28)  Culture - Urine (collected 19 Mar 2018 23:28)  Source: .Urine Clean Catch (Midstream)  Final Report (21 Mar 2018 11:27):    No growth  SPECTRA: [id number]

## 2018-03-23 ENCOUNTER — TRANSCRIPTION ENCOUNTER (OUTPATIENT)
Age: 53
End: 2018-03-23

## 2018-03-23 VITALS — DIASTOLIC BLOOD PRESSURE: 83 MMHG | SYSTOLIC BLOOD PRESSURE: 146 MMHG | HEART RATE: 78 BPM | TEMPERATURE: 96 F

## 2018-03-23 RX ORDER — METRONIDAZOLE 500 MG
1 TABLET ORAL
Qty: 30 | Refills: 0
Start: 2018-03-23 | End: 2018-04-01

## 2018-03-23 RX ORDER — METRONIDAZOLE 500 MG
1 TABLET ORAL
Qty: 30 | Refills: 0
Start: 2018-03-23 | End: 2022-12-22

## 2018-03-23 RX ORDER — CIPROFLOXACIN LACTATE 400MG/40ML
1 VIAL (ML) INTRAVENOUS
Qty: 20 | Refills: 0
Start: 2018-03-23 | End: 2020-02-22

## 2018-03-23 RX ORDER — CIPROFLOXACIN LACTATE 400MG/40ML
750 VIAL (ML) INTRAVENOUS
Qty: 0 | Refills: 0 | Status: DISCONTINUED | OUTPATIENT
Start: 2018-03-23 | End: 2018-03-23

## 2018-03-23 RX ORDER — METRONIDAZOLE 500 MG
1 TABLET ORAL
Qty: 30 | Refills: 0
Start: 2018-03-23 | End: 2020-02-22

## 2018-03-23 RX ORDER — METRONIDAZOLE 500 MG
500 TABLET ORAL EVERY 8 HOURS
Qty: 0 | Refills: 0 | Status: DISCONTINUED | OUTPATIENT
Start: 2018-03-23 | End: 2018-03-23

## 2018-03-23 RX ORDER — CIPROFLOXACIN LACTATE 400MG/40ML
1 VIAL (ML) INTRAVENOUS
Qty: 20 | Refills: 0
Start: 2018-03-23 | End: 2018-04-01

## 2018-03-23 RX ORDER — CIPROFLOXACIN LACTATE 400MG/40ML
1 VIAL (ML) INTRAVENOUS
Qty: 20 | Refills: 0
Start: 2018-03-23 | End: 2022-12-22

## 2018-03-23 RX ADMIN — HEPARIN SODIUM 5000 UNIT(S): 5000 INJECTION INTRAVENOUS; SUBCUTANEOUS at 05:34

## 2018-03-23 RX ADMIN — Medication 100 MILLIGRAM(S): at 05:34

## 2018-03-23 RX ADMIN — Medication 200 MILLIGRAM(S): at 05:34

## 2018-03-23 RX ADMIN — Medication 500 MILLIGRAM(S): at 12:55

## 2018-03-23 NOTE — PROGRESS NOTE ADULT - ASSESSMENT
53 y/o male with episode of acute diverticulitis treated non- operatively with IV antibiotics. Tolerating diet well, pain improved.    Plan: d/c home in am on oral Abx. Recommend outpatient colonoscopy and possible elective surgery as outpatient.  f/u in clinic w/ Dr. asif in 1-2 weeks. 51 y/o male with episode of acute diverticulitis treated non- operatively with IV antibiotics. Tolerating diet well, pain improved.    Plan: d/c home in am on oral Abx. Recommend possible outpatient colonoscopy and possible elective surgery as outpatient.  f/u in clinic w/ Dr. asif in 1-2 weeks.

## 2018-03-23 NOTE — PROGRESS NOTE ADULT - ATTENDING COMMENTS
Patient seen and examined with surgery team on rounds and agree with management plan. Discussed with patient regarding outpatient follow uo and elective surgery.
Patient not in room on rounds
Patient seen and examined with surgery team on rounds and agree with management plan. WBC decreased to 11 today

## 2018-03-23 NOTE — DISCHARGE NOTE ADULT - CARE PLAN
Principal Discharge DX:	Diverticulitis of sigmoid colon  Goal:	Follow up in clinic for surgical planning  Assessment and plan of treatment:	Please contact your gastroenterologist and schedule an outpatient colonoscopy for 6-8 weeks after discharge  Please continue taking the prescribed antibiotics for an additional 10 days as prescribed  Please contact the surgery clinic at the number below and schedule a follow up visit in 2-3 weeks  Secondary Diagnosis:	Perforated bowel

## 2018-03-23 NOTE — DISCHARGE NOTE ADULT - HOSPITAL COURSE
This is a 51y/o male patient with known PMH of uncomplicated diverticulitis who presented to the ED with three days of abdominal pain. Patient states that pain felt similar ot episode he had 2 years ago. He has had chills over the weekend and came to the ED due to persistence of these symptoms. Denied any nausea or vomiting and no fever. Normal BM 24 hours ago. Last colonoscopy was 2 years ago, had polypectomy at that time. He had a CT A/P while in the ED that demonstrated acute perforated diverticulitis of the sigmoid colon w/o any drainable fluid collections and only with surrounding inflammatory changes/phelgmon formation. He was admitted to the surgery service and made NPO on IVF, and was started on IV cipro/flagyl. He initially had generalized tenderness on exam, however he improved with antibiotics and is now only mildly tender in LLQ. He has had normal bowel function and is able to tolerate a soft/low fiber diet. He has been afebrile and his labs have normalized. He is cleared surgically for discharge home. He will follow up in the clinic for further discussion and planning for elective surgical management. He will need a repeat colonoscopy in 8 weeks

## 2018-03-23 NOTE — DISCHARGE NOTE ADULT - PLAN OF CARE
Follow up in clinic for surgical planning Please contact your gastroenterologist and schedule an outpatient colonoscopy for 6-8 weeks after discharge  Please continue taking the prescribed antibiotics for an additional 10 days as prescribed  Please contact the surgery clinic at the number below and schedule a follow up visit in 2-3 weeks

## 2018-03-23 NOTE — DISCHARGE NOTE ADULT - CARE PROVIDER_API CALL
Stephen Read), Surgery  [facility] [place] [address]  Phone: [phone]  Fax: [phone]

## 2018-03-23 NOTE — DISCHARGE NOTE ADULT - NS AS ACTIVITY OBS
Walking-Outdoors allowed/Return to Work/School allowed/Walking-Indoors allowed/Bathing allowed/Showering allowed/Driving allowed/Stairs allowed

## 2018-03-23 NOTE — DISCHARGE NOTE ADULT - MEDICATION SUMMARY - MEDICATIONS TO TAKE
I will START or STAY ON the medications listed below when I get home from the hospital:    metroNIDAZOLE 500 mg oral tablet  -- 1 tab(s) by mouth every 8 hours  -- Indication: For DIVERTICULITIS OF SIGMOID COLON    ciprofloxacin 750 mg oral tablet  -- 1 tab(s) by mouth 2 times a day  -- Indication: For DIVERTICULITIS OF SIGMOID COLON

## 2018-03-23 NOTE — DISCHARGE NOTE ADULT - PATIENT PORTAL LINK FT
You can access the [facility] Patient Portal, offered by [facility], by registering with the following website: [email or web address]

## 2018-03-23 NOTE — PROGRESS NOTE ADULT - SUBJECTIVE AND OBJECTIVE BOX
PATRICIA CHE  52y Male   [id number]    Hospital Day: 4  Post Operative Day:    Procedure/dx: diverticulitis  Events of the Last 24h: no acute events    Patient is a 52y old  Male who presents with a chief complaint of perforated sigmoid colon, diverticulitis (21 Mar 2018 06:39)    PAST MEDICAL & SURGICAL HISTORY:      Vital Signs Last 24 Hrs  T(C): 36.4 (23 Mar 2018 00:00), Max: 36.4 (23 Mar 2018 00:00)  T(F): 97.5 (23 Mar 2018 00:00), Max: 97.5 (23 Mar 2018 00:00)  HR: 71 (23 Mar 2018 00:00) (66 - 81)  BP: 136/73 (23 Mar 2018 00:00) (120/85 - 136/73)  BP(mean): --  RR: 18 (23 Mar 2018 00:00) (18 - 18)  SpO2: --        Diet, Soft:   Fiber/Residue Restricted (18 @ 06:42)      I&O's Summary    21 Mar 2018 07:  -  22 Mar 2018 07:00  --------------------------------------------------------  IN: 440 mL / OUT: 0 mL / NET: 440 mL    22 Mar 2018 07:  -  23 Mar 2018 04:38  --------------------------------------------------------  IN: 960 mL / OUT: 0 mL / NET: 960 mL     I&O's Detail    21 Mar 2018 07:  -  22 Mar 2018 07:00  --------------------------------------------------------  IN:    IV PiggyBack: 200 mL    Oral Fluid: 240 mL  Total IN: 440 mL    OUT:  Total OUT: 0 mL    Total NET: 440 mL      22 Mar 2018 07:01  -  23 Mar 2018 04:38  --------------------------------------------------------  IN:    Oral Fluid: 960 mL  Total IN: 960 mL    OUT:  Total OUT: 0 mL    Total NET: 960 mL          MEDICATIONS  (STANDING):  ciprofloxacin   IVPB 400 milliGRAM(s) IV Intermittent every 12 hours  ciprofloxacin   IVPB      heparin  Injectable 5000 Unit(s) SubCutaneous every 8 hours  metroNIDAZOLE  IVPB 500 milliGRAM(s) IV Intermittent every 8 hours    MEDICATIONS  (PRN):  morphine  - Injectable 2 milliGRAM(s) IV Push every 6 hours PRN Moderate Pain (4 - 6)  ondansetron Injectable 4 milliGRAM(s) IV Push every 6 hours PRN Nausea      PHYSICAL EXAM:    GENERAL: NAD    HEENT: NCAT    CHEST/LUNGS: CTAB    HEART: RRR,  No murmurs, rubs, or gallops    ABDOMEN: SNTND +BS    EXTREMITIES:  FROM, No clubbing, cyanosis, or edema, palpable pulse    NEURO: No focal neurological deficits    SKIN: No rashes or lesions    INCISION/WOUNDS:                          15.9   7.58  )-----------( 189      ( 22 Mar 2018 07:23 )             45.8        CBC Full  -  ( 22 Mar 2018 07:23 )  WBC Count : 7.58 K/uL  Hemoglobin : 15.9 g/dL  Hematocrit : 45.8 %  Platelet Count - Automated : 189 K/uL  Mean Cell Volume : 80.9 fL  Mean Cell Hemoglobin : 28.1 pg  Mean Cell Hemoglobin Concentration : 34.7 g/dL  Auto Neutrophil # : 5.54 K/uL  Auto Lymphocyte # : 1.25 K/uL  Auto Monocyte # : 0.62 K/uL  Auto Eosinophil # : 0.10 K/uL  Auto Basophil # : 0.05 K/uL  Auto Neutrophil % : 73.0 %  Auto Lymphocyte % : 16.5 %  Auto Monocyte % : 8.2 %  Auto Eosinophil % : 1.3 %  Auto Basophil % : 0.7 %               136   |  95    |  10                 Ca: 9.4    BMP:   ----------------------------< 100    M.3   (18 @ 07:23)             4.4    |  30    | 1.2                Ph: 3.2      LFT:     TPro: 7.0 / Alb: 4.0 / TBili: 1.3 / DBili: 0.3 / AST: 14 / ALT: 15 / AlkPhos: 77   (18 @ 23:28)        SPECTRA: [id number] PATRICIA CHE  52y Male   [id number]    Hospital Day: 4  Procedure/dx: diverticulitis  Events of the Last 24h: no acute events    Patient is a 52y old  Male who presents with a chief complaint of perforated sigmoid colon, diverticulitis (21 Mar 2018 06:39)  Vital Signs Last 24 Hrs  T(F): 97.5 (23 Mar 2018 00:00), Max: 97.5 (23 Mar 2018 00:00)  HR: 71 (23 Mar 2018 00:00) (66 - 81)  BP: 136/73 (23 Mar 2018 00:00) (120/85 - 136/73)  RR: 18 (23 Mar 2018 00:00) (18 - 18)      Diet, Soft:   Fiber/Residue Restricted (18 @ 06:42)      MEDICATIONS  (STANDING):  ciprofloxacin   IVPB 400 milliGRAM(s) IV Intermittent every 12 hours  heparin  Injectable 5000 Unit(s) SubCutaneous every 8 hours  metroNIDAZOLE  IVPB 500 milliGRAM(s) IV Intermittent every 8 hours    MEDICATIONS  (PRN):  morphine  - Injectable 2 milliGRAM(s) IV Push every 6 hours PRN Moderate Pain (4 - 6)  ondansetron Injectable 4 milliGRAM(s) IV Push every 6 hours PRN Nausea      PHYSICAL EXAM:    GENERAL: NAD    HEENT: NCAT    CHEST/LUNGS: CTAB    HEART: RRR,  No murmurs, rubs, or gallops    ABDOMEN: SNTND +BS    EXTREMITIES:  FROM, No clubbing, cyanosis, or edema, palpable pulse    NEURO: No focal neurological deficits    SKIN: No rashes or lesions             15.9   7.58  )-----------( 189      ( 22 Mar 2018 07:23 )             45.8        CBC Full  -  ( 22 Mar 2018 07:23 )  WBC Count : 7.58 K/uL  Hemoglobin : 15.9 g/dL  Hematocrit : 45.8 %  Platelet Count - Automated : 189 K/uL               136   |  95    |  10                 Ca: 9.4    BMP:   ----------------------------< 100    M.3   (18 @ 07:23)             4.4    |  30    | 1.2                Ph: 3.2    SPECTRA: [id number]

## 2018-03-26 DIAGNOSIS — Z87.891 PERSONAL HISTORY OF NICOTINE DEPENDENCE: ICD-10-CM

## 2018-03-26 DIAGNOSIS — R10.9 UNSPECIFIED ABDOMINAL PAIN: ICD-10-CM

## 2018-03-26 DIAGNOSIS — K57.20 DIVERTICULITIS OF LARGE INTESTINE WITH PERFORATION AND ABSCESS WITHOUT BLEEDING: ICD-10-CM

## 2018-03-29 DIAGNOSIS — K57.32 DIVERTICULITIS OF LARGE INTESTINE WITHOUT PERFORATION OR ABSCESS WITHOUT BLEEDING: ICD-10-CM

## 2018-10-10 NOTE — ED ADULT NURSE NOTE - CADM POA URETHRAL CATHETER
DATE OF CONSULTATION:  10/09/2018

 

Ms. Molina is a 72-year-old female.  She was having abdominal pain per my 
discussion with her daughter.

 

She was found on CT scanning of the abdomen to have an obstructing stone with 
hydronephrosis.  She underwent cystoscopy and placement of a stent.

 

She had a laryngeal mask initially and then became agitated and required 
intubation.  She has a central line and an arterial line in place now.

 

PAST MEDICAL HISTORY:  Remarkable for cholecystectomy, hysterectomy, kidney 
stones in the past, and cataract surgery.

 

ALLERGIES:  She reports an allergy to DARVOCET.

 

MEDICATIONS:  Prior to admission, she was on Norco, iron, Zofran, ibuprofen, 
and Pepcid.

 

FAMILY HISTORY:  Negative for lung disease in early age.

 

SOCIAL HISTORY:  She has a very supportive family.  Her daughter was a former 
employee of our ICU.  She is a nonsmoker and nondrinker.

 

REVIEW OF SYSTEMS:  Not obtainable because she is intubated.

 

PHYSICAL EXAMINATION:

VITAL SIGNS:  Blood pressure on arrival in the ICU is 129/82, it is 87/56 now; 
heart rate is 120; respiratory rate 13.

HEENT:  Pupils are equal.  Sclerae are anicteric.

NECK:  Supple.

LUNGS:  Clear.

HEART:  Regular rhythm.  No S3.

ABDOMEN:  Soft and nontender without guarding, but she was sedated from her 
cystoscopy.

EXTREMITIES:  Without clubbing, cyanosis, or edema.

NEUROLOGIC:  Could not be assessed.

 

LABORATORY AND X-RAY FINDINGS:  White count 9.1, hemoglobin 10, platelets 44,
000.  She had 24% bands on her peripheral smear.  Electrolytes, sodium 139, 
potassium 3.5, chloride 112, bicarbonate 18, BUN 22, creatinine 1.3, glucose 
62.  Repeat glucose on arrival in the ICU was low.  She has been given D50 and 
switched to D10.  PH after intubation was 7.37, pCO2 of 23, pO2 of 108.  PH 
earlier on a slower ventilatory rate was 7.28, pCO2 of 31, pO2 of 100.

 

IMPRESSION:  Urinary tract sepsis with pyelonephritis and hydronephrosis.  She 
needs more volume resuscitation.  She will be kept sedated.  I met with the 
daughter twice and answered all of her questions.

 

Critical care 35 minutes
MTDD No

## 2018-12-12 NOTE — DISCHARGE NOTE ADULT - NS DC ANGIO PCI YN
Date of Service: 12/12/2018    Comprehensive psychiatric evaluation (51996)    SOURCES OF INFORMATION:  Interview of the patient and observation of his behavior, review of the intake record.    This is the first time admission of Phani, a 50-year-old  male who considered himself homeless, although indicated he wants to go to [facility].    PRESENTING PROBLEM:  Medically supervised detoxification for opiates, Heroin and polydrugs with anxiety.    The patient is very tired.  Denies being depressed, denies being suicidal.  Although he indicated coming here for detoxification for Heroin, he claims that in the last 1 month he is using a gram of Heroin intravenously injected on the nuchal area.  In addition, he also used Cocaine \"not much.\"  The patient could not even remember how much he is taking, but \"I need to come because I have withdrawal symptoms.\"    The patient indicated using it for many, many years but worse in the last 3-4 months.  Admits to tremors, diaphoresis, claimed 1 episode of seizure, anxiety, panic attack, depression but denies being suicidal.  Denies any auditory hallucination.    The patient indicated that he was detoxed at one time in one clinic that he could not remember.  The patient is not taking any medication for depression on any medical problem.    ALLERGIES:  Vicodin and Ibuprofen.    FAMILY HISTORY:  No available information at this point.    SOCIAL HISTORY:  Currently homeless.    VITAL SIGNS:  Blood pressure is 133/79, pulse rate of 54 per minute, temperature of 97.7, respiratory rate of 14 per minute.    Opiate withdrawal score is 3, 5 and 3.    LABORATORY TESTS:  Potassium 4.8.  Alkaline phosphatase, SGPT, SGOT within normal limits.  BUN, creatinine within normal limits.  Blood sugar of 126.  CBC and differential count are not significant.  BAL is 0.  Drug screen is positive for opiates and Cocaine.    REVIEW OF SYSTEMS:  Complained of headache and tiredness.   No sore throat, no chest pain, nausea, no vomiting, tremors, diaphoresis, no evidence of impending seizures or DTs, irritability.    MENTAL STATUS ASSESSMENT:  Revealed a fairly developed, slim male who is very, very tired, does not want to get out of bed, \"can you talk to me now.\"  Suffering from withdrawal symptoms and anxious but not depressed, not suicidal.  He is oriented x3.  Memory is good.  Concentration is fair.  Intelligence assessed to be around average.  Insight and judgment are questionable at this point.    DIAGNOSIS:    Polydrug abuse, opiate withdrawal symptoms, generalized anxiety disorder, personality disorder.    PLAN:  Medically supervised detoxification of opiates with use of Clonidine, Gabapentin, Vitamins.  Will watch for any withdrawal symptoms, encouraged increased fluids, attended the groups.  Referral for outpatient after discharge.      Dictated By: Checo Patton MD  Signing Provider: Checo Patton MD    /mika ([id number])  DD: 12/12/2018 15:52:57 TD: 12/12/2018 16:18:37    Copy Sent To:    no

## 2019-05-14 ENCOUNTER — EMERGENCY (EMERGENCY)
Facility: HOSPITAL | Age: 54
LOS: 0 days | Discharge: HOME | End: 2019-05-14
Attending: EMERGENCY MEDICINE | Admitting: EMERGENCY MEDICINE
Payer: COMMERCIAL

## 2019-05-14 VITALS
DIASTOLIC BLOOD PRESSURE: 78 MMHG | SYSTOLIC BLOOD PRESSURE: 120 MMHG | HEART RATE: 96 BPM | TEMPERATURE: 97 F | RESPIRATION RATE: 18 BRPM | OXYGEN SATURATION: 96 %

## 2019-05-14 VITALS — HEIGHT: 70 IN | WEIGHT: 203.93 LBS

## 2019-05-14 DIAGNOSIS — R11.2 NAUSEA WITH VOMITING, UNSPECIFIED: ICD-10-CM

## 2019-05-14 DIAGNOSIS — R19.7 DIARRHEA, UNSPECIFIED: ICD-10-CM

## 2019-05-14 DIAGNOSIS — Z79.2 LONG TERM (CURRENT) USE OF ANTIBIOTICS: ICD-10-CM

## 2019-05-14 DIAGNOSIS — F17.200 NICOTINE DEPENDENCE, UNSPECIFIED, UNCOMPLICATED: ICD-10-CM

## 2019-05-14 LAB
ANION GAP SERPL CALC-SCNC: 13 MMOL/L — SIGNIFICANT CHANGE UP (ref 7–14)
BASOPHILS # BLD AUTO: 0.06 K/UL — SIGNIFICANT CHANGE UP (ref 0–0.2)
BASOPHILS NFR BLD AUTO: 0.4 % — SIGNIFICANT CHANGE UP (ref 0–1)
BUN SERPL-MCNC: 16 MG/DL — SIGNIFICANT CHANGE UP (ref 10–20)
CALCIUM SERPL-MCNC: 9.3 MG/DL — SIGNIFICANT CHANGE UP (ref 8.5–10.1)
CHLORIDE SERPL-SCNC: 97 MMOL/L — LOW (ref 98–110)
CO2 SERPL-SCNC: 27 MMOL/L — SIGNIFICANT CHANGE UP (ref 17–32)
CREAT SERPL-MCNC: 1.1 MG/DL — SIGNIFICANT CHANGE UP (ref 0.7–1.5)
EOSINOPHIL # BLD AUTO: 0.14 K/UL — SIGNIFICANT CHANGE UP (ref 0–0.7)
EOSINOPHIL NFR BLD AUTO: 1 % — SIGNIFICANT CHANGE UP (ref 0–8)
GLUCOSE SERPL-MCNC: 92 MG/DL — SIGNIFICANT CHANGE UP (ref 70–99)
HCT VFR BLD CALC: 50.7 % — SIGNIFICANT CHANGE UP (ref 42–52)
HGB BLD-MCNC: 17.7 G/DL — SIGNIFICANT CHANGE UP (ref 14–18)
IMM GRANULOCYTES NFR BLD AUTO: 0.4 % — HIGH (ref 0.1–0.3)
LYMPHOCYTES # BLD AUTO: 18.5 % — LOW (ref 20.5–51.1)
LYMPHOCYTES # BLD AUTO: 2.49 K/UL — SIGNIFICANT CHANGE UP (ref 1.2–3.4)
MCHC RBC-ENTMCNC: 28.1 PG — SIGNIFICANT CHANGE UP (ref 27–31)
MCHC RBC-ENTMCNC: 34.9 G/DL — SIGNIFICANT CHANGE UP (ref 32–37)
MCV RBC AUTO: 80.5 FL — SIGNIFICANT CHANGE UP (ref 80–94)
MONOCYTES # BLD AUTO: 1.08 K/UL — HIGH (ref 0.1–0.6)
MONOCYTES NFR BLD AUTO: 8 % — SIGNIFICANT CHANGE UP (ref 1.7–9.3)
NEUTROPHILS # BLD AUTO: 9.67 K/UL — HIGH (ref 1.4–6.5)
NEUTROPHILS NFR BLD AUTO: 71.7 % — SIGNIFICANT CHANGE UP (ref 42.2–75.2)
NRBC # BLD: 0 /100 WBCS — SIGNIFICANT CHANGE UP (ref 0–0)
PLATELET # BLD AUTO: 231 K/UL — SIGNIFICANT CHANGE UP (ref 130–400)
POTASSIUM SERPL-MCNC: 3.6 MMOL/L — SIGNIFICANT CHANGE UP (ref 3.5–5)
POTASSIUM SERPL-SCNC: 3.6 MMOL/L — SIGNIFICANT CHANGE UP (ref 3.5–5)
RBC # BLD: 6.3 M/UL — HIGH (ref 4.7–6.1)
RBC # FLD: 12.9 % — SIGNIFICANT CHANGE UP (ref 11.5–14.5)
SODIUM SERPL-SCNC: 137 MMOL/L — SIGNIFICANT CHANGE UP (ref 135–146)
WBC # BLD: 13.49 K/UL — HIGH (ref 4.8–10.8)
WBC # FLD AUTO: 13.49 K/UL — HIGH (ref 4.8–10.8)

## 2019-05-14 PROCEDURE — 99284 EMERGENCY DEPT VISIT MOD MDM: CPT

## 2019-05-14 RX ORDER — LOPERAMIDE HCL 2 MG
1 TABLET ORAL
Qty: 30 | Refills: 0
Start: 2019-05-14

## 2019-05-14 RX ORDER — ONDANSETRON 8 MG/1
4 TABLET, FILM COATED ORAL ONCE
Refills: 0 | Status: COMPLETED | OUTPATIENT
Start: 2019-05-14 | End: 2019-05-14

## 2019-05-14 RX ORDER — SODIUM CHLORIDE 9 MG/ML
1000 INJECTION, SOLUTION INTRAVENOUS ONCE
Refills: 0 | Status: COMPLETED | OUTPATIENT
Start: 2019-05-14 | End: 2019-05-14

## 2019-05-14 RX ORDER — LOPERAMIDE HCL 2 MG
4 TABLET ORAL ONCE
Refills: 0 | Status: COMPLETED | OUTPATIENT
Start: 2019-05-14 | End: 2019-05-14

## 2019-05-14 RX ADMIN — Medication 4 MILLIGRAM(S): at 09:17

## 2019-05-14 RX ADMIN — SODIUM CHLORIDE 1000 MILLILITER(S): 9 INJECTION, SOLUTION INTRAVENOUS at 09:15

## 2019-05-14 RX ADMIN — ONDANSETRON 4 MILLIGRAM(S): 8 TABLET, FILM COATED ORAL at 09:16

## 2019-05-14 NOTE — ED PROVIDER NOTE - PHYSICAL EXAMINATION
CONSTITUTIONAL: well developed; well nourished; well appearing in no acute distress  HEAD: normocephalic; atraumatic  EYES: no conjunctival injection, no scleral icterus  ENT: no nasal discharge; airway clear  M: mildly dry MM, no drooling/stridor  NECK: supple; non tender. + full passive ROM in all directions  CARD: S1, S2 normal; no murmurs, gallops, or rubs. Regular rate and rhythm  RESP: no wheezes, rales or rhonchi. Good air movement bilaterally without significant accessory muscle use  ABD: soft; non-distended; non-tender. No rebound, no guarding, no pulsatile abdominal mass  EXT: moving all extremities spontaneously, normal ROM. No clubbing, cyanosis or edema  SKIN: warm and dry, no lesions noted  NEURO: alert, oriented, CN II-XII grossly intact, motor and sensory grossly intact, speech nonslurred, no focal deficits. GCS 15  PSYCH: calm, cooperative, appropriate, good eye contact, logical thought process, no apparent danger to self or others

## 2019-05-14 NOTE — ED ADULT NURSE NOTE - OBJECTIVE STATEMENT
Pt c/o diarrhea, vomiting, and nausea since Saturday, pt also endorses being unable to tolerate anything PO. Denies fevers, chills, back pain, urinary pain, chest pain, shortness of breath.

## 2019-05-14 NOTE — ED PROVIDER NOTE - CLINICAL SUMMARY MEDICAL DECISION MAKING FREE TEXT BOX
54yM smoker p/w n/v/d x 4d.  Pt not sig dehydrated on exam.  labs w/ leukocytosis but normal electrolytes and preserved renal function.  Pt now tolerating PO w/o further vomiting. Pt treated with IV LR, zofran and imodium.  Ok to dc with supportive care, f/u PCP, return precautions.

## 2019-05-14 NOTE — ED PROVIDER NOTE - OBJECTIVE STATEMENT
54yM active smoker p/w 4d of vomiting w/ occ diarrhea.  No bloody diarrhea.  No abd pain.  Pt w/ 2 remote episodes diverticulitis but reports that presented as constipation and felt very different.  Pt is worried about dehydration and reports poor PO tolerance. No recent travel or sick contacts. No pets at home.  Initially thought it was food poisoning, but sx have persisted for days.

## 2019-05-14 NOTE — ED PROVIDER NOTE - NSFOLLOWUPINSTRUCTIONS_ED_ALL_ED_FT
You were seen in the ED for vomiting and diarrhea.  You likely have a viral illness causing these symptoms.  It is important for you to try to stay hydrated at home, even if you do not feel like eating, as you can lose a lot of fluids through diarrhea. Follow up with your primary physician or any urgent care or clinic if you have still having these symptoms in 3+ days or if you feel you are getting dehydrated again.    Diarrhea    Diarrhea is frequent loose or watery bowel movements that has many causes. Diarrhea can make you feel weak and cause you to become dehydrated. Diarrhea typically lasts 2–3 days, but can last longer if it is a sign of something more serious. Drink clear fluids to prevent dehydration. Eat bland, easy-to-digest foods as tolerated.     SEEK IMMEDIATE MEDICAL CARE IF YOU HAVE ANY OF THE FOLLOWING SYMPTOMS: high fevers, lightheadedness/dizziness, chest pain, black or bloody stools, shortness of breath, severe abdominal or back pain, or any signs of dehydration.

## 2019-05-14 NOTE — ED PROVIDER NOTE - NS ED ROS FT
Constitutional: no fevers/chills, no sick contacts  Eyes: No visual changes, eye pain or discharge. No photophobia  ENMT: No hearing changes, pain, discharge or infections. No sore throat or drooling.  Neck:  No neck pain or stiffness. No limited ROM  Cardiac: No SOB or edema. No chest pain with exertion.  Respiratory: No cough or respiratory distress. No hemoptysis. No history of asthma or RAD  GI: + nausea, vomiting, diarrhea, no abdominal pain  : No dysuria, frequency or burning. No discharge  MS: No myalgia, muscle weakness, joint pain or back pain  Neuro: No headache or weakness. No LOC  Skin: No skin rash  Endo: no diabetes or thyroid dysfunction  Heme: no abnormal bleeding or clotting  Except as documented in the HPI, all other systems are negative

## 2019-05-14 NOTE — SBIRT NOTE. - NSSBIRTSERVICES_GEN_A_ED_FT
Provided SBIRT services:  Full Screen Positive. Brief Intervention Performed.    Screening results were reviewed with the patient and patient was provided information about healthy guidelines and potential negative consequences associated with level of risk. Motivation and readiness to reduce or stop use was discussed and goals and activities to make changes were suggested/offered.      AUDIT Score: 4  DAST-10 Score: 1  Duration = 15 Minutes

## 2019-05-14 NOTE — ED ADULT NURSE NOTE - NSIMPLEMENTINTERV_GEN_ALL_ED
Implemented All Universal Safety Interventions:  [place] to call system. Call bell, personal items and telephone within reach. Instruct patient to call for assistance. Room bathroom lighting operational. Non-slip footwear when patient is off stretcher. Physically safe environment: no spills, clutter or unnecessary equipment. Stretcher in lowest position, wheels locked, appropriate side rails in place.

## 2020-02-12 ENCOUNTER — EMERGENCY (EMERGENCY)
Facility: HOSPITAL | Age: 55
LOS: 0 days | Discharge: HOME | End: 2020-02-13
Attending: EMERGENCY MEDICINE | Admitting: EMERGENCY MEDICINE
Payer: COMMERCIAL

## 2020-02-12 VITALS
RESPIRATION RATE: 18 BRPM | OXYGEN SATURATION: 96 % | HEART RATE: 55 BPM | TEMPERATURE: 99 F | WEIGHT: 199.96 LBS | DIASTOLIC BLOOD PRESSURE: 88 MMHG | SYSTOLIC BLOOD PRESSURE: 155 MMHG

## 2020-02-12 DIAGNOSIS — K57.92 DIVERTICULITIS OF INTESTINE, PART UNSPECIFIED, WITHOUT PERFORATION OR ABSCESS WITHOUT BLEEDING: ICD-10-CM

## 2020-02-12 DIAGNOSIS — R10.9 UNSPECIFIED ABDOMINAL PAIN: ICD-10-CM

## 2020-02-12 DIAGNOSIS — F17.200 NICOTINE DEPENDENCE, UNSPECIFIED, UNCOMPLICATED: ICD-10-CM

## 2020-02-12 PROCEDURE — 99283 EMERGENCY DEPT VISIT LOW MDM: CPT

## 2020-02-13 RX ORDER — CIPROFLOXACIN LACTATE 400MG/40ML
500 VIAL (ML) INTRAVENOUS ONCE
Refills: 0 | Status: COMPLETED | OUTPATIENT
Start: 2020-02-13 | End: 2020-02-13

## 2020-02-13 RX ORDER — METRONIDAZOLE 500 MG
500 TABLET ORAL ONCE
Refills: 0 | Status: COMPLETED | OUTPATIENT
Start: 2020-02-13 | End: 2020-02-13

## 2020-02-13 RX ORDER — OXYCODONE AND ACETAMINOPHEN 5; 325 MG/1; MG/1
2 TABLET ORAL ONCE
Refills: 0 | Status: DISCONTINUED | OUTPATIENT
Start: 2020-02-13 | End: 2020-02-13

## 2020-02-13 RX ADMIN — Medication 500 MILLIGRAM(S): at 01:35

## 2020-02-13 RX ADMIN — OXYCODONE AND ACETAMINOPHEN 2 TABLET(S): 5; 325 TABLET ORAL at 01:35

## 2020-02-13 NOTE — ED PROVIDER NOTE - CARE PROVIDER_API CALL
Nitish Jacome)  Geriatric Medicine; Internal Medicine  [address] [place] [address] [id number]  Phone: [phone]  Fax: [phone]  Follow Up Time:

## 2020-02-13 NOTE — ED ADULT NURSE NOTE - CHPI ED NUR SYMPTOMS NEG
no diarrhea/no fever/no blood in stool/no burning urination/no chills/no hematuria/no nausea/no dysuria/no vomiting

## 2020-02-13 NOTE — ED PROVIDER NOTE - CLINICAL SUMMARY MEDICAL DECISION MAKING FREE TEXT BOX
pt with LLQ pain clinically cw acute divertic. no guarding or rebound. soft abdomen. no n, v, fever. cipro/flagyl abx. clear diet (explained) and close outpt f/u.

## 2020-02-13 NOTE — ED PROVIDER NOTE - PATIENT PORTAL LINK FT
You can access the FollowMyHealth Patient Portal offered by [facility] by registering at the following website: [email or web address]. By joining [facility]’s FollowMyHealth portal, you will also be able to view your health information using other applications (apps) compatible with our system.

## 2020-02-13 NOTE — ED PROVIDER NOTE - PROGRESS NOTE DETAILS
pt with LLQ pain, started a few  hours ago. tender, mildly, but no guarding or rebound. similar to diveritulcilitis in the past. opted not to get imaging done, req abx and he will call his gi/pmd in the am for follow up. knows to return for vomiting, fever, worsening pain.

## 2020-02-13 NOTE — ED PROVIDER NOTE - OBJECTIVE STATEMENT
55 yo m with LLQ pain that started around 5pm. non rad. sharp. no n, v, d. no fever or chills. hx of diverticulitis. feels similar but milder at this time. no testicular pain. no hematuria.

## 2020-02-13 NOTE — ED PROVIDER NOTE - NSFOLLOWUPINSTRUCTIONS_ED_ALL_ED_FT
Call Dr Jacome and your gastro doctor in the am for follow up.    Diverticulitis    Diverticulitis is inflammation or infection of small pouches in your colon that form when you HAVE a condition called diverticulosis. This condition can range from mild to severe potentially leading to perforation or obstructions of your colon. Symptoms include abdominal pain, fever/chills, nausea, vomiting, diarrhea, constipation, or blood in your stool. If you were prescribed an antibiotic medicine, take it as told by your health care provider. Do not stop taking the antibiotic even if you start to feel better.    SEEK IMMEDIATE MEDICAL CARE IF YOU HAVE ANY OF THE FOLLOWING SYMPTOMS: worsening abdominal pain, high fever, inability to hold down liquids or medication, black or bloody stools, inability to pass gas, lightheadedness/dizziness, or a change in mental status.

## 2020-02-13 NOTE — ED PROVIDER NOTE - PHYSICAL EXAMINATION
VITAL SIGNS: I have reviewed nursing notes and confirm.  CONSTITUTIONAL: Well-developed; well-nourished; in no acute distress.  SKIN: Skin exam is warm and dry, no acute rash.  HEAD: Normocephalic; atraumatic.  EYES: PERRL, EOM intact; conjunctiva and sclera clear.  ENT: No nasal discharge; airway clear.   NECK: Supple; non tender.  CARD:+ S1, S2   RESP: No wheezes, rales or rhonchi.  ABD: Normal bowel sounds; soft; non-distended; mild tender in LLQ. no rebound. no guarding.   EXT: Normal ROM. No cyanosis or edema.  LYMPH: No acute adenopathy.  NEURO: Alert. Grossly unremarkable. No focal deficits.  PSYCH: Cooperative, appropriate.

## 2021-01-28 NOTE — PATIENT PROFILE ADULT. - FALL HARM RISK CONCLUSION
"Chief Complaint   Patient presents with   • Establish Care   • Referral Needed     colonoscopy    • Back Pain     low back and hip pain        Subjective:   Darien Martinez is a 51 y.o. male here today to establish care and for evaluation and management of:    DDD (degenerative disc disease), lumbar  Chronic issues.  Degenerative disc disease.  Is trying to stengthen core.  Takes occasional advil.  Stretching, roller ball.     Decreased libido  Patient is requesting updated testosterone level.  Continues to have decline in sexual performance.    Chewing tobacco use  New problem to me.  Chronic, user for 30 years.  Would like to quit.  Sees dentist yearly.  No sores in mouth, no swelling in neck or throat reported.     Pure hypercholesterolemia  New problem.  Due for labs.  Has not been on medication in the past.  Working on weight loss: keto.  Has gained some weight this past 2 months.          Current medicines (including changes today)  No current outpatient medications on file.     No current facility-administered medications for this visit.      He  has a past medical history of Hyperlipidemia. He also has no past medical history of Asthma, Diabetes (HCC), or Hypertension.    ROS as stated in hpi  No chest pain, no shortness of breath, no abdominal pain       Objective:     /84 (BP Location: Left arm, Patient Position: Sitting, BP Cuff Size: Adult)   Pulse 83   Temp 37 °C (98.6 °F) (Temporal)   Ht 1.727 m (5' 8\")   Wt 92.1 kg (203 lb)   SpO2 99%  Body mass index is 30.87 kg/m². stable.   Physical Exam:  Constitutional: Alert, no distress.  Skin: Warm, dry, good turgor,no cyanosis, no rashes in visible areas.  Eye: Equal, round and reactive, conjunctiva clear, lids normal.  Neck: Trachea midline, no masses, no obvious thyroid enlargement.. No cervical or supraclavicular lymphadenopathy. Range of motion within normal limits.  Neuro: Cranial nerves 2-12 grossly intact.  No sensory " deficit.  Respiratory: Unlabored respiratory effort, lungs clear to auscultation, no wheezes, no ronchi.  Cardiovascular: Normal S1, S2, no murmur, no edema.  Abdomen: Soft, non-tender, no masses, no guarding,  no hepatosplenomegaly.  Psych: Alert and oriented x3, normal affect and mood and judgement.        Assessment and Plan:   The following treatment plan was discussed    1. Pure hypercholesterolemia  New problem.  Historical.  Due for labs.  Monitor and follow.   - CBC WITH DIFFERENTIAL; Future  - Comp Metabolic Panel; Future  - Lipid Profile; Future  - VITAMIN D,25 HYDROXY; Future    2. Screen for colon cancer  Due for colonoscopy.  Occasional blood with BM.  Referral placed.  Monitor.  - REFERRAL TO GI FOR COLONOSCOPY    3. DDD (degenerative disc disease), lumbar  Ne wproblem.  Chronic, ongoing. Stable with stretching, advised ice, continued stretching,  Discussed trying voltaren gel. Monitor and follow.     4. Decreased libido  New problem to me.  Chronic,  Will check testosterone level today.  Monitor and follow.   - TESTOSTERONE SERUM; Future    5. Need for vaccination  Due for 2nd vaccine.  No acute illness  I have placed the below orders and discussed them with an approved delegating provider.  The MA is performing the below orders under the direction of Dr. Anton MD.    - Shingles Vaccine (SHINGRIX)    6. Chewing tobacco use  New problem.  Chronic, ongoing. Encouraged cessation.  Discussed importance of yearly mouth checks with dentist.  Patient is hoping to quit this year.  Monitor and follow.       Followup: No follow-ups on file.         Educated in proper administration of medication(s) ordered today including safety, possible SE, risks, benefits, rationale and alternatives to therapy.     Please note that this dictation was created using voice recognition software. I have made every reasonable attempt to correct obvious errors, but I expect that there are errors of grammar and possibly content  that I did not discover before finalizing the note.   Universal Safety Interventions

## 2021-02-23 ENCOUNTER — EMERGENCY (EMERGENCY)
Facility: HOSPITAL | Age: 56
LOS: 0 days | Discharge: HOME | End: 2021-02-23
Attending: EMERGENCY MEDICINE | Admitting: EMERGENCY MEDICINE
Payer: COMMERCIAL

## 2021-02-23 VITALS
DIASTOLIC BLOOD PRESSURE: 75 MMHG | TEMPERATURE: 98 F | HEART RATE: 81 BPM | SYSTOLIC BLOOD PRESSURE: 140 MMHG | RESPIRATION RATE: 18 BRPM | OXYGEN SATURATION: 97 %

## 2021-02-23 VITALS
RESPIRATION RATE: 18 BRPM | WEIGHT: 207.9 LBS | OXYGEN SATURATION: 97 % | TEMPERATURE: 99 F | HEIGHT: 70 IN | SYSTOLIC BLOOD PRESSURE: 136 MMHG | DIASTOLIC BLOOD PRESSURE: 81 MMHG | HEART RATE: 88 BPM

## 2021-02-23 DIAGNOSIS — F17.200 NICOTINE DEPENDENCE, UNSPECIFIED, UNCOMPLICATED: ICD-10-CM

## 2021-02-23 DIAGNOSIS — K57.30 DIVERTICULOSIS OF LARGE INTESTINE WITHOUT PERFORATION OR ABSCESS WITHOUT BLEEDING: ICD-10-CM

## 2021-02-23 DIAGNOSIS — R10.9 UNSPECIFIED ABDOMINAL PAIN: ICD-10-CM

## 2021-02-23 LAB
ALBUMIN SERPL ELPH-MCNC: 4.3 G/DL — SIGNIFICANT CHANGE UP (ref 3.5–5.2)
ALP SERPL-CCNC: 77 U/L — SIGNIFICANT CHANGE UP (ref 30–115)
ALT FLD-CCNC: 19 U/L — SIGNIFICANT CHANGE UP (ref 0–41)
ANION GAP SERPL CALC-SCNC: 9 MMOL/L — SIGNIFICANT CHANGE UP (ref 7–14)
APPEARANCE UR: CLEAR — SIGNIFICANT CHANGE UP
AST SERPL-CCNC: 15 U/L — SIGNIFICANT CHANGE UP (ref 0–41)
BASOPHILS # BLD AUTO: 0.08 K/UL — SIGNIFICANT CHANGE UP (ref 0–0.2)
BASOPHILS NFR BLD AUTO: 0.5 % — SIGNIFICANT CHANGE UP (ref 0–1)
BILIRUB SERPL-MCNC: 1.6 MG/DL — HIGH (ref 0.2–1.2)
BILIRUB UR-MCNC: NEGATIVE — SIGNIFICANT CHANGE UP
BUN SERPL-MCNC: 11 MG/DL — SIGNIFICANT CHANGE UP (ref 10–20)
CALCIUM SERPL-MCNC: 9.3 MG/DL — SIGNIFICANT CHANGE UP (ref 8.5–10.1)
CHLORIDE SERPL-SCNC: 99 MMOL/L — SIGNIFICANT CHANGE UP (ref 98–110)
CO2 SERPL-SCNC: 28 MMOL/L — SIGNIFICANT CHANGE UP (ref 17–32)
COLOR SPEC: YELLOW — SIGNIFICANT CHANGE UP
CREAT SERPL-MCNC: 1 MG/DL — SIGNIFICANT CHANGE UP (ref 0.7–1.5)
DIFF PNL FLD: NEGATIVE — SIGNIFICANT CHANGE UP
EOSINOPHIL # BLD AUTO: 0.1 K/UL — SIGNIFICANT CHANGE UP (ref 0–0.7)
EOSINOPHIL NFR BLD AUTO: 0.6 % — SIGNIFICANT CHANGE UP (ref 0–8)
GLUCOSE SERPL-MCNC: 88 MG/DL — SIGNIFICANT CHANGE UP (ref 70–99)
GLUCOSE UR QL: NEGATIVE — SIGNIFICANT CHANGE UP
HCT VFR BLD CALC: 44.4 % — SIGNIFICANT CHANGE UP (ref 42–52)
HGB BLD-MCNC: 15.6 G/DL — SIGNIFICANT CHANGE UP (ref 14–18)
IMM GRANULOCYTES NFR BLD AUTO: 0.2 % — SIGNIFICANT CHANGE UP (ref 0.1–0.3)
KETONES UR-MCNC: NEGATIVE — SIGNIFICANT CHANGE UP
LACTATE SERPL-SCNC: 0.9 MMOL/L — SIGNIFICANT CHANGE UP (ref 0.7–2)
LEUKOCYTE ESTERASE UR-ACNC: NEGATIVE — SIGNIFICANT CHANGE UP
LIDOCAIN IGE QN: 23 U/L — SIGNIFICANT CHANGE UP (ref 7–60)
LYMPHOCYTES # BLD AUTO: 13 % — LOW (ref 20.5–51.1)
LYMPHOCYTES # BLD AUTO: 2.12 K/UL — SIGNIFICANT CHANGE UP (ref 1.2–3.4)
MCHC RBC-ENTMCNC: 28.1 PG — SIGNIFICANT CHANGE UP (ref 27–31)
MCHC RBC-ENTMCNC: 35.1 G/DL — SIGNIFICANT CHANGE UP (ref 32–37)
MCV RBC AUTO: 79.9 FL — LOW (ref 80–94)
MONOCYTES # BLD AUTO: 1.22 K/UL — HIGH (ref 0.1–0.6)
MONOCYTES NFR BLD AUTO: 7.5 % — SIGNIFICANT CHANGE UP (ref 1.7–9.3)
NEUTROPHILS # BLD AUTO: 12.71 K/UL — HIGH (ref 1.4–6.5)
NEUTROPHILS NFR BLD AUTO: 78.2 % — HIGH (ref 42.2–75.2)
NITRITE UR-MCNC: NEGATIVE — SIGNIFICANT CHANGE UP
NRBC # BLD: 0 /100 WBCS — SIGNIFICANT CHANGE UP (ref 0–0)
PH UR: 6.5 — SIGNIFICANT CHANGE UP (ref 5–8)
PLATELET # BLD AUTO: 214 K/UL — SIGNIFICANT CHANGE UP (ref 130–400)
POTASSIUM SERPL-MCNC: 4 MMOL/L — SIGNIFICANT CHANGE UP (ref 3.5–5)
POTASSIUM SERPL-SCNC: 4 MMOL/L — SIGNIFICANT CHANGE UP (ref 3.5–5)
PROT SERPL-MCNC: 7.4 G/DL — SIGNIFICANT CHANGE UP (ref 6–8)
PROT UR-MCNC: SIGNIFICANT CHANGE UP
RBC # BLD: 5.56 M/UL — SIGNIFICANT CHANGE UP (ref 4.7–6.1)
RBC # FLD: 13.5 % — SIGNIFICANT CHANGE UP (ref 11.5–14.5)
SODIUM SERPL-SCNC: 136 MMOL/L — SIGNIFICANT CHANGE UP (ref 135–146)
SP GR SPEC: 1.02 — SIGNIFICANT CHANGE UP (ref 1.01–1.03)
UROBILINOGEN FLD QL: SIGNIFICANT CHANGE UP
WBC # BLD: 16.27 K/UL — HIGH (ref 4.8–10.8)
WBC # FLD AUTO: 16.27 K/UL — HIGH (ref 4.8–10.8)

## 2021-02-23 PROCEDURE — 74177 CT ABD & PELVIS W/CONTRAST: CPT | Mod: 26

## 2021-02-23 PROCEDURE — 99285 EMERGENCY DEPT VISIT HI MDM: CPT

## 2021-02-23 RX ORDER — MORPHINE SULFATE 50 MG/1
4 CAPSULE, EXTENDED RELEASE ORAL ONCE
Refills: 0 | Status: DISCONTINUED | OUTPATIENT
Start: 2021-02-23 | End: 2021-02-23

## 2021-02-23 RX ORDER — METRONIDAZOLE 500 MG
1 TABLET ORAL
Qty: 30 | Refills: 0
Start: 2021-02-23 | End: 2021-03-04

## 2021-02-23 RX ORDER — CIPROFLOXACIN LACTATE 400MG/40ML
1 VIAL (ML) INTRAVENOUS
Qty: 20 | Refills: 0
Start: 2021-02-23 | End: 2021-03-04

## 2021-02-23 RX ORDER — CIPROFLOXACIN LACTATE 400MG/40ML
1 VIAL (ML) INTRAVENOUS
Qty: 20 | Refills: 0
Start: 2021-02-23 | End: 2022-12-22

## 2021-02-23 RX ORDER — CIPROFLOXACIN LACTATE 400MG/40ML
400 VIAL (ML) INTRAVENOUS ONCE
Refills: 0 | Status: COMPLETED | OUTPATIENT
Start: 2021-02-23 | End: 2021-02-23

## 2021-02-23 RX ORDER — SODIUM CHLORIDE 9 MG/ML
1000 INJECTION INTRAMUSCULAR; INTRAVENOUS; SUBCUTANEOUS ONCE
Refills: 0 | Status: COMPLETED | OUTPATIENT
Start: 2021-02-23 | End: 2021-02-23

## 2021-02-23 RX ORDER — KETOROLAC TROMETHAMINE 30 MG/ML
15 SYRINGE (ML) INJECTION ONCE
Refills: 0 | Status: DISCONTINUED | OUTPATIENT
Start: 2021-02-23 | End: 2021-02-23

## 2021-02-23 RX ORDER — METRONIDAZOLE 500 MG
500 TABLET ORAL ONCE
Refills: 0 | Status: COMPLETED | OUTPATIENT
Start: 2021-02-23 | End: 2021-02-23

## 2021-02-23 RX ADMIN — Medication 15 MILLIGRAM(S): at 16:59

## 2021-02-23 RX ADMIN — MORPHINE SULFATE 4 MILLIGRAM(S): 50 CAPSULE, EXTENDED RELEASE ORAL at 11:26

## 2021-02-23 RX ADMIN — Medication 400 MILLIGRAM(S): at 14:25

## 2021-02-23 RX ADMIN — SODIUM CHLORIDE 1000 MILLILITER(S): 9 INJECTION INTRAMUSCULAR; INTRAVENOUS; SUBCUTANEOUS at 12:29

## 2021-02-23 RX ADMIN — SODIUM CHLORIDE 1000 MILLILITER(S): 9 INJECTION INTRAMUSCULAR; INTRAVENOUS; SUBCUTANEOUS at 11:26

## 2021-02-23 RX ADMIN — Medication 500 MILLIGRAM(S): at 14:25

## 2021-02-23 RX ADMIN — Medication 200 MILLIGRAM(S): at 12:12

## 2021-02-23 RX ADMIN — Medication 100 MILLIGRAM(S): at 12:12

## 2021-02-23 RX ADMIN — MORPHINE SULFATE 4 MILLIGRAM(S): 50 CAPSULE, EXTENDED RELEASE ORAL at 15:08

## 2021-02-23 NOTE — ED PROVIDER NOTE - OBJECTIVE STATEMENT
Pt is a 55 year old male with PMH Diverticulitis (last flare > 1 year prior) presents to ED with complaints of abdominal pain. Pt states since 0200 developed L sided (lower>upper) abdominal pain, associated with loose stool. Pt states pain is moderate, non radiating with no alleviating or aggravating factors. Pt last colonoscopy > 1 year prior with benign polyp noted. Pt denies any fever, chills, bodyaches, chest pain, sob, NVCD, dysuria or hematuria

## 2021-02-23 NOTE — ED PROVIDER NOTE - PROGRESS NOTE DETAILS
Appears well, feling better with pain meds, WBC 16, will prescribe Cipro/Flagyl, CT scan is pending. CT scan shows acute diverticulitis without abscess or perforation, patient is afebrile, tolerating PO, stable for d/c home with oral abx trial. Patient to be discharged from ED. Any available test results were discussed with patient and/or family. Verbal instructions given, including instructions to return to ED immediately for any new, worsening, or concerning symptoms. Patient endorsed understanding. Written discharge instructions additionally given, including follow-up plan.  Patient was given opportunity to ask questions. CT scan shows acute diverticulitis without abscess or perforation, patient is afebrile, tolerating PO, stable for d/c home with oral abx trial.  Advised to follow up with his gastroenterologist and PMD tin a few days. Patient to be discharged from ED. Any available test results were discussed with patient and/or family. Verbal instructions given, including instructions to return to ED immediately for any new, worsening, or concerning symptoms. Patient endorsed understanding. Written discharge instructions additionally given, including follow-up plan.  Patient was given opportunity to ask questions.

## 2021-02-23 NOTE — ED PROVIDER NOTE - CLINICAL SUMMARY MEDICAL DECISION MAKING FREE TEXT BOX
56 yo male PMH several episodes of diverticulitis in the past c/o left sided abdominal pain since yesterday similar to prior.  pain is constant, non-radiating, not associated with fever., chills, N/V/D/black or bloody stools, no urinary complaints, skin rash, change in appetite or any other additional complaints.   Well-appearing well-nourished, NAD, head AT/NC, PERRL, pink conjunctivae, nml phonation, supple neck without midline spine ttp, nml work of breathing, lungs CTA b/l, equal air entry, RRR, well-perfused extremities, distal pulses intact, abdomen soft, ND, + LLQ ttp without rebound, BS present in all quadrants, no midline spine or CVA ttp, no leg edema or unilateral calf swelling, A&Ox3, no focal neuro deficits, nml mood and affect.  Exam and history concerning for diverticulitis.   CT scan shows acute diverticulitis without abscess or perforation, patient is afebrile, tolerating PO, stable for d/c home with oral abx trial.

## 2021-02-23 NOTE — ED ADULT NURSE NOTE - INTERVENTIONS DEFINITIONS
Non-slip footwear when patient is off stretcher/Physically safe environment: no spills, clutter or unnecessary equipment/Provide visual cue, wrist band, yellow gown, etc./Monitor gait and stability/Monitor for mental status changes and reorient to person, place, and time

## 2021-02-23 NOTE — ED PROVIDER NOTE - NSFOLLOWUPINSTRUCTIONS_ED_ALL_ED_FT
Follow up with your primary medical doctor in 1-2 days as well as with your GI doctor    Diverticulitis  Diverticulitis is infection or inflammation of small pouches (diverticula) in the colon that form due to a condition called diverticulosis. Diverticula can trap stool (feces) and bacteria, causing infection and inflammation.    Diverticulitis may cause severe stomach pain and diarrhea. It may lead to tissue damage in the colon that causes bleeding. The diverticula may also burst (rupture) and cause infected stool to enter other areas of the abdomen.    Complications of diverticulitis can include:  Bleeding.  Severe infection.  Severe pain.  Rupture (perforation) of the colon.  Blockage (obstruction) of the colon.  What are the causes?  This condition is caused by stool becoming trapped in the diverticula, which allows bacteria to grow in the diverticula. This leads to inflammation and infection.    What increases the risk?  You are more likely to develop this condition if:  You have diverticulosis. The risk for diverticulosis increases if:  You are overweight or obese.  You use tobacco products.  You do not get enough exercise.  You eat a diet that does not include enough fiber. High-fiber foods include fruits, vegetables, beans, nuts, and whole grains.  What are the signs or symptoms?  Symptoms of this condition may include:  Pain and tenderness in the abdomen. The pain is normally located on the left side of the abdomen, but it may occur in other areas.  Fever and chills.  Bloating.  Cramping.  Nausea.  Vomiting.  Changes in bowel routines.  Blood in your stool.  How is this diagnosed?  This condition is diagnosed based on:  Your medical history.  A physical exam.  Tests to make sure there is nothing else causing your condition. These tests may include:  Blood tests.  Urine tests.  Imaging tests of the abdomen, including X-rays, ultrasounds, MRIs, or CT scans.  How is this treated?  Most cases of this condition are mild and can be treated at home. Treatment may include:  Taking over-the-counter pain medicines.  Following a clear liquid diet.  Taking antibiotic medicines by mouth.  Rest.  More severe cases may need to be treated at a hospital. Treatment may include:  Not eating or drinking.  Taking prescription pain medicine.  Receiving antibiotic medicines through an IV tube.  Receiving fluids and nutrition through an IV tube.  Surgery.  When your condition is under control, your health care provider may recommend that you have a colonoscopy. This is an exam to look at the entire large intestine. During the exam, a lubricated, bendable tube is inserted into the anus and then passed into the rectum, colon, and other parts of the large intestine. A colonoscopy can show how severe your diverticula are and whether something else may be causing your symptoms.    Follow these instructions at home:  Medicines     Take over-the-counter and prescription medicines only as told by your health care provider. These include fiber supplements, probiotics, and stool softeners.  If you were prescribed an antibiotic medicine, take it as told by your health care provider. Do not stop taking the antibiotic even if you start to feel better.  Do not drive or use heavy machinery while taking prescription pain medicine.  General instructions     Follow a full liquid diet or another diet as directed by your health care provider. After your symptoms improve, your health care provider may tell you to change your diet. He or she may recommend that you eat a diet that contains at least 25 g (25 grams) of fiber daily. Fiber makes it easier to pass stool. Healthy sources of fiber include:  Berries. One cup contains 4–8 grams of fiber.  Beans or lentils. One half cup contains 5–8 grams of fiber.  Green vegetables. One cup contains 4 grams of fiber.  Exercise for at least 30 minutes, 3 times each week. You should exercise hard enough to raise your heart rate and break a sweat.  Keep all follow-up visits as told by your health care provider. This is important. You may need a colonoscopy.  Contact a health care provider if:  Your pain does not improve.  You have a hard time drinking or eating food.  Your bowel movements do not return to normal.  Get help right away if:  Your pain gets worse.  Your symptoms do not get better with treatment.  Your symptoms suddenly get worse.  You have a fever.  You vomit more than one time.  You have stools that are bloody, black, or tarry.  Summary  Diverticulitis is infection or inflammation of small pouches (diverticula) in the colon that form due to a condition called diverticulosis. Diverticula can trap stool (feces) and bacteria, causing infection and inflammation.  You are at higher risk for this condition if you have diverticulosis and you eat a diet that does not include enough fiber.  Most cases of this condition are mild and can be treated at home. More severe cases may need to be treated at a hospital.  When your condition is under control, your health care provider may recommend that you have an exam called a colonoscopy. This exam can show how severe your diverticula are and whether something else may be causing your symptoms.  This information is not intended to replace advice given to you by your health care provider. Make sure you discuss any questions you have with your health care provider.

## 2021-02-23 NOTE — ED PROVIDER NOTE - CARE PROVIDER_API CALL
Eulogio Flores  GASTROENTEROLOGY  [address] [place] [address]  Phone: [phone]  Fax: [phone]  Follow Up Time: 1-3 Days

## 2021-02-23 NOTE — ED ADULT NURSE NOTE - OBJECTIVE STATEMENT
patient presents to the ed with complaints of abdominal pain. speaks like a diverticulitis flare up. patient denies changes in bowel movements n/v, chills, and fever. patient states had smoothie last night and he has felt off ever since. MA in place and rtf bracelet applied.

## 2021-02-23 NOTE — ED PROVIDER NOTE - PHYSICAL EXAMINATION
Physical Exam    Vital Signs: I have reviewed the initial vital signs.  Constitutional: well-nourished, appears stated age, no acute distress  Eyes: Conjunctiva pink, Sclera clear, PERRLA, EOMI.  Cardiovascular: S1 and S2, regular rate, regular rhythm, well-perfused extremities, radial pulses equal and 2+  Respiratory: unlabored respiratory effort, clear to auscultation bilaterally no wheezing, rales and rhonchi  Gastrointestinal: soft, tender abdomen LLQ>LUQ, no pulsatile mass, normal bowl sounds  Rectal: no hemorrhoids noted, no fissures noted and no BRBPR   Musculoskeletal: supple neck, no lower extremity edema, no midline tenderness  Integumentary: warm, dry, no rash  Neurologic: awake, alert, cranial nerves II-XII grossly intact, extremities’ motor and sensory functions grossly intact  Psychiatric: appropriate mood, appropriate affect

## 2021-02-23 NOTE — ED PROVIDER NOTE - ATTENDING CONTRIBUTION TO CARE
54 yo male PMH several episodes of diverticulitis in the past c/o left sided abdominal pain since yesterday similar to prior.  pain is constant, non-radiating, not associated with fever., chills, N/V/D/black or bloody stools, no urinary complaints, skin rash, change in appetite or any other additional complaints.   Well-appearing well-nourished, NAD, head AT/NC, PERRL, pink conjunctivae, nml phonation, supple neck without midline spine ttp, nml work of breathing, lungs CTA b/l, equal air entry, RRR, well-perfused extremities, distal pulses intact, abdomen soft, ND, + LLQ ttp without rebound, BS present in all quadrants, no midline spine or CVA ttp, no leg edema or unilateral calf swelling, A&Ox3, no focal neuro deficits, nml mood and affect.  Exam and history concerning for diverticulitis.  Will treat pain, check labs, CT scan r/o perf/abscess, reassess.

## 2021-02-23 NOTE — ED PROVIDER NOTE - NS ED ROS FT
Constitutional: (-) fever  Eyes/ENT: (-) blurry vision, (-) epistaxis  Cardiovascular: (-) chest pain, (-) syncope  Respiratory: (-) cough, (-) shortness of breath  Gastrointestinal: (-) vomiting, (-) diarrhea (+) abdominal pain  Musculoskeletal: (-) neck pain, (-) back pain, (-) joint pain  Integumentary: (-) rash, (-) edema  Neurological: (-) headache, (-) altered mental status  Psychiatric: (-) hallucinations  Allergic/Immunologic: (-) pruritus

## 2021-02-23 NOTE — ED PROVIDER NOTE - PATIENT PORTAL LINK FT
You can access the FollowMyHealth Patient Portal offered by [facility] by registering at the following website: [email or web address]. By joining [employer]’s FollowMyHealth portal, you will also be able to view your health information using other applications (apps) compatible with our system.

## 2021-02-24 LAB
CULTURE RESULTS: NO GROWTH — SIGNIFICANT CHANGE UP
SPECIMEN SOURCE: SIGNIFICANT CHANGE UP

## 2021-04-15 NOTE — PATIENT PROFILE ADULT. - HAS THE PATIENT USED TOBACCO IN THE PAST 30 DAYS?
MARITZA  Pt called back and I relied/recommendations to pt. Pt states understanding and stated that she has been fighting a sinus infection and just recently got her covid injection, that may be why the white count was slightly high. Yes

## 2021-11-21 NOTE — SBIRT NOTE. - NSSBIRTSERVICES_GEN_A_ED
Full Screen ONLY/Brief Intervention Performed
You can access the FollowMyHealth Patient Portal offered by [facility] by registering at the following website: [email or web address]. By joining [employer]’s FollowMyHealth portal, you will also be able to view your health information using other applications (apps) compatible with our system.

## 2022-06-27 ENCOUNTER — EMERGENCY (EMERGENCY)
Facility: HOSPITAL | Age: 57
LOS: 0 days | Discharge: HOME | End: 2022-06-27
Attending: EMERGENCY MEDICINE | Admitting: EMERGENCY MEDICINE

## 2022-06-27 VITALS
SYSTOLIC BLOOD PRESSURE: 153 MMHG | DIASTOLIC BLOOD PRESSURE: 67 MMHG | OXYGEN SATURATION: 97 % | RESPIRATION RATE: 19 BRPM | HEART RATE: 71 BPM | HEIGHT: 70 IN | TEMPERATURE: 97 F | WEIGHT: 210.1 LBS

## 2022-06-27 DIAGNOSIS — S80.01XA CONTUSION OF RIGHT KNEE, INITIAL ENCOUNTER: ICD-10-CM
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
## 2022-06-27 DIAGNOSIS — S93.402A SPRAIN OF UNSPECIFIED LIGAMENT OF LEFT ANKLE, INITIAL ENCOUNTER: ICD-10-CM

## 2022-06-27 DIAGNOSIS — S80.211A ABRASION, RIGHT KNEE, INITIAL ENCOUNTER: ICD-10-CM

## 2022-06-27 DIAGNOSIS — M25.561 PAIN IN RIGHT KNEE: ICD-10-CM

## 2022-06-27 DIAGNOSIS — M25.572 PAIN IN LEFT ANKLE AND JOINTS OF LEFT FOOT: ICD-10-CM
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
## 2022-06-27 PROCEDURE — 73630 X-RAY EXAM OF FOOT: CPT | Mod: 26,LT

## 2022-06-27 PROCEDURE — 73610 X-RAY EXAM OF ANKLE: CPT | Mod: 26,LT

## 2022-06-27 PROCEDURE — 29515 APPLICATION SHORT LEG SPLINT: CPT

## 2022-06-27 PROCEDURE — 99284 EMERGENCY DEPT VISIT MOD MDM: CPT | Mod: 25

## 2022-06-27 PROCEDURE — 73562 X-RAY EXAM OF KNEE 3: CPT | Mod: 26,RT

## 2022-06-27 RX ORDER — IBUPROFEN 200 MG
600 TABLET ORAL ONCE
Refills: 0 | Status: COMPLETED | OUTPATIENT
Start: 2022-06-27 | End: 2022-06-27

## 2022-06-27 RX ADMIN — Medication 600 MILLIGRAM(S): at 21:03

## 2022-06-27 NOTE — ED PROVIDER NOTE - CARE PLAN
Principal Discharge DX:	Ankle sprain  Secondary Diagnosis:	Knee contusion  Secondary Diagnosis:	Skin abrasion   1

## 2022-06-27 NOTE — ED PROVIDER NOTE - PATIENT PORTAL LINK FT
You can access the FollowMyHealth Patient Portal offered by [facility] by registering at the following website: [email or web address]. By joining [employer]’s FollowMyHealth portal, you will also be able to view your health information using other applications (apps) compatible with our system.
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
## 2022-06-27 NOTE — ED PROVIDER NOTE - NSFOLLOWUPINSTRUCTIONS_ED_ALL_ED_FT
Follow up with your primary care doctor and your orthopedic in 1-2 days     Ankle Sprain    WHAT YOU NEED TO KNOW:    An ankle sprain happens when 1 or more ligaments in your ankle joint stretch or tear. Ligaments are tough tissues that connect bones. Ligaments support your joints and keep your bones in place.    DISCHARGE INSTRUCTIONS:    Return to the emergency department if:     You have severe pain in your ankle.      Your foot or toes are cold or numb.      Your ankle becomes more weak or unstable (wobbly).      You are unable to put any weight on your ankle or foot.      Your swelling has increased or returned.    Contact your healthcare provider if:     Your pain does not go away, even after treatment.      You have questions or concerns about your condition or care.    Medicines: You may need any of the following:     NSAIDs, such as ibuprofen, help decrease swelling, pain, and fever. This medicine is available with or without a doctor's order. NSAIDs can cause stomach bleeding or kidney problems in certain people. If you take blood thinner medicine, always ask your healthcare provider if NSAIDs are safe for you. Always read the medicine label and follow directions.      Acetaminophen decreases pain. It is available without a doctor's order. Ask how much to take and how often to take it. Follow directions. Acetaminophen can cause liver damage if not taken correctly.      Prescription pain medicine may be given. Ask how to take this medicine safely.      Take your medicine as directed. Contact your healthcare provider if you think your medicine is not helping or if you have side effects. Tell him or her if you are allergic to any medicine. Keep a list of the medicines, vitamins, and herbs you take. Include the amounts, and when and why you take them. Bring the list or the pill bottles to follow-up visits. Carry your medicine list with you in case of an emergency.    Self care:     Use support devices, such as a brace, cast, or splint, may be needed to limit your movement and protect your joint. You may need to use crutches to decrease your pain as you move around.       Go to physical therapy as directed. A physical therapist teaches you exercises to help improve movement and strength, and to decrease pain.      Rest your ankle so that it can heal. Return to normal activities as directed.      Apply ice on your ankle for 15 to 20 minutes every hour or as directed. Use an ice pack, or put crushed ice in a plastic bag. Cover it with a towel. Ice helps prevent tissue damage and decreases swelling and pain.      Compress your ankle. Ask if you should wrap an elastic bandage around your injured ligament. An elastic bandage provides support and helps decrease swelling and movement so your joint can heal. Wear as long as directed.      Elevate your ankle above the level of your heart as often as you can. This will help decrease swelling and pain. Prop your ankle on pillows or blankets to keep it elevated comfortably. Elevate Limb         Prevent another ankle sprain:     Let your ankle heal. Find out how long your ligament needs to heal. Do not do any physical activity until your healthcare provider says it is okay. If you start activity too soon, you may develop a more serious injury.      Always warm up and stretch before you exercise or play sports.      Use the right equipment. Always wear shoes that fit well and are made for the activity that you are doing. You may also need ankle supports, elbow and knee pads, or braces.    Follow up with your healthcare provider as directed: Write down your questions so you remember to ask them during your visits.        © Copyright [employer] 2019 All illustrations and images included in CareNotes are the copyrighted property of [employer]. or [employer].        Knee Pain    WHAT YOU NEED TO KNOW:    Knee pain may start suddenly, or it may be a long-term problem. You may have pain on the side, front, or back of your knee. You may have knee stiffness and swelling. You may hear popping sounds or feel like your knee is giving way or locking up as you walk. You may feel pain when you sit, stand, walk, or climb up and down stairs. Knee pain can be caused by conditions such as obesity, inflammation, or strains or tears in ligaments or tendons.     DISCHARGE INSTRUCTIONS:    Return to the emergency department if:     Your pain is worse, even after treatment.       You cannot bend or straighten your leg completely.       The swelling around your knee does not go down even with treatment.      Your knee is painful and hot to the touch.     Contact your healthcare provider if:     You have questions or concerns about your condition or care.         Medicines: You may need any of the following:     NSAIDs help decrease swelling and pain or fever. This medicine is available with or without a doctor's order. NSAIDs can cause stomach bleeding or kidney problems in certain people. If you take blood thinner medicine, always ask your healthcare provider if NSAIDs are safe for you. Always read the medicine label and follow directions.      Acetaminophen decreases pain and fever. It is available without a doctor's order. Ask how much to take and how often to take it. Follow directions. Read the labels of all other medicines you are using to see if they also contain acetaminophen, or ask your doctor or pharmacist. Acetaminophen can cause liver damage if not taken correctly. Do not use more than 4 grams (4,000 milligrams) total of acetaminophen in one day.       Prescription pain medicine may be given. Ask your healthcare provider how to take this medicine safely. Some prescription pain medicines contain acetaminophen. Do not take other medicines that contain acetaminophen without talking to your healthcare provider. Too much acetaminophen may cause liver damage. Prescription pain medicine may cause constipation. Ask your healthcare provider how to prevent or treat constipation.       Take your medicine as directed. Contact your healthcare provider if you think your medicine is not helping or if you have side effects. Tell him or her if you are allergic to any medicine. Keep a list of the medicines, vitamins, and herbs you take. Include the amounts, and when and why you take them. Bring the list or the pill bottles to follow-up visits. Carry your medicine list with you in case of an emergency.    What you can do to manage your symptoms:     Rest your knee so it can heal. Limit activities that increase your pain. Do low-impact exercises, such as walking or swimming.       Apply ice to help reduce swelling and pain. Use an ice pack, or put crushed ice in a plastic bag. Cover it with a towel before you apply it to your knee. Apply ice for 15 to 20 minutes every hour, or as directed.      Apply compression to help reduce swelling. Use a brace or bandage only as directed.      Elevate your knee to help decrease pain and swelling. Elevate your knee while you are sitting or lying down. Prop your leg on pillows to keep your knee above the level of your heart.      Prevent your knee from moving as directed. Your healthcare provider may put on a cast or splint. You may need to wear a leg brace to stabilize your knee. A leg brace can be adjusted to increase your range of motion as your knee heals.Hinged Knee Braces          What you can do to prevent knee pain:     Maintain a healthy weight. Extra weight increases your risk for knee pain. Ask your healthcare provider how much you should weigh. He or she can help you create a safe weight loss plan if you need to lose weight.      Exercise or train properly. Use the correct equipment for sports. Wear shoes that provide good support. Check your posture often as you exercise, play sports, or train for an event. This can help prevent stress and strain on your knees. Rest between sessions so you do not overwork your knees.    Follow up with your healthcare provider within 24 hours or as directed: You may need follow-up treatments, such as steroid injections to decrease pain. Write down your questions so you remember to ask them during your visits.        © Copyright [employer] 2019 All illustrations and images included in CareNotes are the copyrighted property of [employer]. or [employer].       Abrasion  ImageAn abrasion is a cut or a scrape on the outer surface of the skin. An abrasion does not go through all the layers of the skin. It is important to care for your abrasion properly to prevent infection.    What are the causes?  This condition is caused by falling on or gliding across the ground or another surface. When your skin rubs on something, the outer and inner layers of skin may rub off.    What are the signs or symptoms?  The main symptom of this condition is a cut or a scrape. The scrape may be bleeding, or it may appear red or pink. If the abrasion was caused by a fall, there may be a bruise under the cut or scrape.    How is this diagnosed?  An abrasion is diagnosed with a physical exam.    How is this treated?  Treatment for this condition depends on how large and deep the abrasion is. In most cases:    Your abrasion will be cleaned with water and mild soap. This is done to remove any dirt or debris (such as particles of glass or rock) that may be stuck in the wound.  An antibiotic ointment may be applied to the abrasion to help prevent infection.  A bandage (dressing) may be placed on the abrasion to keep it clean.    You may also need a tetanus shot.    Follow these instructions at home:  Medicines     Take or apply over-the-counter and prescription medicines only as told by your health care provider.  If you were prescribed an antibiotic medicine, apply it as told by your health care provider.  Wound care     Clean the wound 2–3 times a day, or as directed by your health care provider. To do this, wash the wound with mild soap and water, rinse off the soap, and pat the wound dry with a clean towel. Do not rub the wound.  Keep the dressing clean and dry as told by your health care provider.  There are many different ways to close and cover a wound. Follow instructions from your health care provider about:    Caring for your wound.  Changing and removing your dressing. You may have to change your dressing one or more times a day, or as directed by your health care provider.    Check your wound every day for signs of infection. Check for:    Redness, particularly a red streak that spreads out from the wound.  Swelling or increased pain.  Warmth.  Fluid, pus, or a bad smell.    If directed, put ice on the injured area to reduce pain and swelling:    Put ice in a plastic bag.   Place a towel between your skin and the bag.   Leave the ice on for 20 minutes, 2–3 times a day.    General instructions     Do not take baths, swim, or use a hot tub until your health care provider says it is okay to do so.  If possible, raise (elevate) the injured area above the level of your heart while you are sitting or lying down. This will reduce pain and swelling.  Keep all follow-up visits as directed by your health care provider. This is important.  Contact a health care provider if:  You received a tetanus shot, and you have swelling, severe pain, redness, or bleeding at the injection site.  Your pain is not controlled with medicine.  You have redness, swelling, or more pain at the site of your wound.  Get help right away if:  You have a red streak spreading away from your wound.  You have a fever.  You have fluid, blood, or pus coming from your wound.  You notice a bad smell coming from your wound or your dressing.  Summary  An abrasion is a cut or a scrape on the outer surface of the skin. An abrasion does not go through all the layers of the skin.  Care for your abrasion properly to prevent infection.  Clean the wound with mild soap and water 2–3 times a day. Follow instructions from your health care provider about taking medicines and changing your bandage (dressing).  Contact your health care provider if you have redness, swelling or more pain in the wound area.  Get help right away if you have a fever or if you have fluid, blood, pus, a bad smell, or a red streak coming from the wound.  This information is not intended to replace advice given to you by your health care provider. Make sure you discuss any questions you have with your health care provider.

## 2022-06-27 NOTE — ED PROVIDER NOTE - PHYSICAL EXAMINATION
Physical Exam    Vital Signs: I have reviewed the initial vital signs.  Constitutional: well-nourished, appears stated age, no acute distress  Eyes: Conjunctiva pink, Sclera clear, PERRLA, EOMI.  Cardiovascular: S1 and S2, regular rate, regular rhythm, well-perfused extremities, radial pulses equal and 2+  Respiratory: unlabored respiratory effort, clear to auscultation bilaterally no wheezing, rales and rhonchi  Gastrointestinal: soft, non-tender abdomen, no pulsatile mass, normal bowl sounds  Musculoskeletal: supple neck, no lower extremity edema, no midline tenderness + left ankle and lateral foot swelling and tenderness noted, FORM of all joints, + right knee mild swelling noted with abrasion, FORM knee, no laxity neg anterior and posterior draw sign   Integumentary: warm, dry, no rash  Neurologic: awake, alert, cranial nerves II-XII grossly intact, extremities’ motor and sensory functions grossly intact  Psychiatric: appropriate mood, appropriate affect
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
## 2022-06-29 ENCOUNTER — APPOINTMENT (OUTPATIENT)
Dept: ORTHOPEDIC SURGERY | Facility: CLINIC | Age: 57
End: 2022-06-29

## 2022-06-29 DIAGNOSIS — Z78.9 OTHER SPECIFIED HEALTH STATUS: ICD-10-CM

## 2022-06-29 PROCEDURE — 99203 OFFICE O/P NEW LOW 30 MIN: CPT

## 2022-06-29 PROCEDURE — L4361: CPT | Mod: LT

## 2022-06-29 RX ORDER — NAPROXEN 500 MG/1
500 TABLET ORAL
Qty: 60 | Refills: 0 | Status: ACTIVE | COMMUNITY
Start: 2022-06-29 | End: 1900-01-01

## 2022-06-29 NOTE — PHYSICAL EXAM
[] : negative Mac test [FreeTextEntry3] :  mild-to-moderate ankle swelling [FreeTextEntry8] :  pain to palpation over lateral malleolus/ lateral ligaments [FreeTextEntry9] :  full range of motion with mild pain to lateral ankle [de-identified] :  good strength throughout

## 2022-06-29 NOTE — DISCUSSION/SUMMARY
[de-identified] :  discussed x-ray results patient, negative for acute fracture.  The patient has ankle sprain/ bone contusion.  Discussed in detail with patient.  It generally takes 4-6 weeks to fully heal, 2 weeks usually the worst.  Discussed further treatment options patient including rest, anti-inflammatories, range-of-motion exercises, warm water Epsom salt soaks, tall cam walker boot.  Patient placed in tall cam walker boot weight-bearing as tolerated, use crutches if in pain.   naproxen 500 mg b.i.d. sent to patient's pharmacy, discussed side effects, watch blood pressure.Follow-up in 3-4 weeks for re-evaluation, call if any questions or concerns, patient understands agrees with plan..  Patient seen under supervision of Dr. Li.
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
## 2022-07-19 ENCOUNTER — APPOINTMENT (OUTPATIENT)
Dept: PAIN MANAGEMENT | Facility: CLINIC | Age: 57
End: 2022-07-19

## 2022-07-22 ENCOUNTER — APPOINTMENT (OUTPATIENT)
Dept: ORTHOPEDIC SURGERY | Facility: CLINIC | Age: 57
End: 2022-07-22

## 2022-09-26 ENCOUNTER — RESULT CHARGE (OUTPATIENT)
Age: 57
End: 2022-09-26

## 2022-09-26 ENCOUNTER — APPOINTMENT (OUTPATIENT)
Dept: ORTHOPEDIC SURGERY | Facility: CLINIC | Age: 57
End: 2022-09-26

## 2022-09-26 VITALS — HEIGHT: 70 IN | BODY MASS INDEX: 29.35 KG/M2 | WEIGHT: 205 LBS

## 2022-09-26 DIAGNOSIS — S99.912A UNSPECIFIED INJURY OF LEFT ANKLE, INITIAL ENCOUNTER: ICD-10-CM

## 2022-09-26 PROCEDURE — 73610 X-RAY EXAM OF ANKLE: CPT | Mod: LT

## 2022-09-26 PROCEDURE — 99213 OFFICE O/P EST LOW 20 MIN: CPT

## 2022-09-26 PROCEDURE — 73630 X-RAY EXAM OF FOOT: CPT | Mod: LT

## 2022-09-26 NOTE — ASSESSMENT
[FreeTextEntry1] :   Patient is going to come out of the Cam walker boot at this time he is several months from the injury.  Lace-up ankle brace was provided.  Recommending physical therapy.  Warm soaks Epson salt.  Follow-up in several weeks for repeat evaluation, if the pain continues we can discuss further imaging with MRI of ankle.\par \par This patient was seen under the supervision of Dr. Li.\par

## 2022-09-26 NOTE — PHYSICAL EXAM
[de-identified] :  On examination of left ankle mild swelling, ecchymosis, no erythema.  Skin is intact.  Very mild tenderness around the lateral ankle ligaments.  No tenderness over the medial malleolus, no tenderness over the deltoid ligament.  No tenderness over the medial malleolus.  No tenderness over the Achilles or calcaneus.  Fairly good motion to plantar flexion, dorsiflexion, inversion eversion.  He does report occasional random pain in the ankle plan range of motion.  Calf is soft.\par \par Repeat x-ray today of left foot and ankle no obvious acute fracture.  Rounded ossific density adjacent to the medial malleolus, which was also noted on previous x-ray from hospital.
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
## 2022-09-27 ENCOUNTER — APPOINTMENT (OUTPATIENT)
Dept: PAIN MANAGEMENT | Facility: CLINIC | Age: 57
End: 2022-09-27

## 2022-09-27 VITALS
WEIGHT: 205 LBS | HEART RATE: 95 BPM | DIASTOLIC BLOOD PRESSURE: 92 MMHG | HEIGHT: 70 IN | BODY MASS INDEX: 29.35 KG/M2 | SYSTOLIC BLOOD PRESSURE: 140 MMHG

## 2022-09-27 DIAGNOSIS — M25.561 PAIN IN RIGHT KNEE: ICD-10-CM

## 2022-09-27 DIAGNOSIS — M25.562 PAIN IN RIGHT KNEE: ICD-10-CM

## 2022-09-27 DIAGNOSIS — M99.73 CONNECTIVE TISSUE AND DISC STENOSIS OF INTERVERTEBRAL FORAMINA OF LUMBAR REGION: ICD-10-CM

## 2022-09-27 DIAGNOSIS — M54.16 RADICULOPATHY, LUMBAR REGION: ICD-10-CM

## 2022-09-27 DIAGNOSIS — M99.63 OSSEOUS AND SUBLUXATION STENOSIS OF INTERVERTEBRAL FORAMINA OF LUMBAR REGION: ICD-10-CM

## 2022-09-27 PROCEDURE — 99214 OFFICE O/P EST MOD 30 MIN: CPT

## 2022-09-27 NOTE — PHYSICAL EXAM
[de-identified] : GENERAL APPEARANCE OF PATIENT IS WELL DEVELOPED, WELL NOURISHED, BODY HABITUS NORMAL, WELL GROOMED, NO DEFORMITIES NOTED. \par Head - Atraumatic and Normocephalic \par Eyes, Nose, and Throat: External inspection of ears and nose are normal overall without scars, lesions, or masses noted. Assessment of hearing is normal\par Neck-Examination of neck shows no masses, overall appearance is normal, neck is symmetric, tracheal position is midline, no crepitus is noted. Examination of thyroid shows no enlargement, tenderness or masses\par Respiratory- Assessment of respiratory effort shows no intercostal retractions, no use of accessory muscles, unlabored breathing, and normal diaphragmatic movement.\par Cardiovascular- Examination of extremities show no edema or varicosities\par Musculoskeletal. Examination of gait is not antalgic and station is normal\par Inspection and palpation of digits and nails shows no clubbing, cyanosis, nodules, drainage, fluctuance, petechiae\par \par • Spine –right fact pain at L3-S1. Right PSIs pain and reverse Jaden’s Positive.\par \par \par • Neck- inspection and palpation shows no misalignment, asymmetry, crepitation, defects, tenderness, masses, effusions. ROM is normal without crepitation or contracture. No instability or subluxation or laxity is noted. No abnormal movements.\par \par \par • RUE- inspection and palpation shows no misalignment, asymmetry, crepitation, defects, tenderness, masses, effusions. ROM is normal without crepitation or contracture. No instability or subluxation or laxity is noted. No abnormal movements.\par \par \par • LUE- inspection and palpation shows no misalignment, asymmetry, crepitation, defects, tenderness, masses, effusions. ROM is normal without crepitation or contracture. No instability or subluxation or laxity is noted. No abnormal movements.\par \par \par • RLE- pain on the lateral aspect of the right knee.\par \par \par • LLE- inspection and palpation shows no misalignment, asymmetry, crepitation, defects, tenderness, masses, effusions. ROM is normal without crepitation or contracture. No instability or subluxation or laxity is noted. No abnormal movements.\par \par \par • Skin- Inspection of skin and subcutaneous tissue shows no rashes, lesions or ulcers. Palpation of skin and subcutaneous tissue shows no rashes, no indurations, subcutaneous nodules or tightening.\par \par \par • Abdomen- Nontender\par \par \par • Neurologic- CN 2-12 are grossly intact. No sensory or motor deficits in the upper and lower extremities. Adequate strength in upper and lower extremities \par \par \par • Psychiatric- Patient’s judgment and insight are intact. Oriented to time, place and person. Recent and remote memory intact.

## 2022-09-27 NOTE — HISTORY OF PRESENT ILLNESS
[FreeTextEntry1] : HISTORY OF PRESENT ILLNESS: This is a 57 year old man complaining of lower back and bilateral knee pain. The patient has had this pain for 34 years. The pain has worsened in the last 10 years. The pain started after no specific injury or event. Patient describes pain as severe. During the last month the pain has been constant with symptoms worse in the morning. Pain described as burning, sharp, pressure-like, dull/aching, shooting and cutting. Pain is associated with numbness and pins and needles into the lower extremities. Patient does not experience weakness. Pain is not changed with lying down, standing, sitting, walking, exercise, relaxation, coughing sneezing or bowel movements. Bowel or bladder habits have not changed.\par \par ACTIVITIES: Patient could walk 2 blocks before the pain starts. Patient can sit 20 minutes before pain starts. Patient can stand 1 hour before pain starts. The patient sometimes lays down because of pain. Patient uses no assistive walking device at this time. Patient has difficulty performing household chores, doing yard work or shopping, socializing with friends, participating in recreational activities and exercising at this time.\par \par PRIOR PAIN TREATMENTS: Moderate relief with nerve block/injection and heat treatment\par \par PRIOR PAIN MEDICATIONS: No prior pain medication\par \par PRESENTING TODAY: In revisit encounter in regard to his continued lower back and bilateral knee pain. Patient had MRIs of the lubar spine and x-rays of bilateral knees which were reviewed in detail during today's encounter. Patient notes pain remains unchanged in severity or distribution since his last encounter. \par \par Covid 19 - This in-office encounter has occurred during a Public Health Emergency (PHE). As required by law, due to the risk of respiratory-transmitted infectious diseases, our office provided additional materials, supplies and clinical staff to assist in keeping our patients, physicians and office staff safe during this health emergency.

## 2022-09-27 NOTE — DISCUSSION/SUMMARY
[de-identified] : Mr. Burton is a 57-year-old male with a chief complaint of lower back pain and bilateral knee pain, right greater than left. Patient had a lumbar MRI and x-rays of the bilateral knees which were reviewed in detail during today's encounter. \par \par In regard to his lower back pain, Patient had a MRI that shows a radicular component along with pain referred into the lower extremity. Patient has trialed rehab (Home exercise, physical therapy or chiropractic care) and medications I will schedule a bilateral L4-5 SNRI. If no relief from the SNRI, we would trial a left, followed by right L3-S1 MBB.   Risk, benefits, pros and cons of procedure were explained to the patient using models and diagrams and their questions were answered.  \par \par The patient has severe anxiety of procedures that necessitates monitored anesthesia care (MAC). The procedure performed will be close to major nerves, arteries, and spinal cord and/or joint structures. Due to the proximity of these structures, we need the patient to be still during the procedure.  With the help of MAC, this will be safely achieved and decrease the risk of any complications.\par \par Patient has decrease range of motion and pain in the bilateral knee joints. Will schedule a  joint injection with fluoroscopic guidance to better visualize the joint. If ongoing relief of greater than 30% for 4 months can be repeated in 4-6 months VS Synvisc or Euflexxa or PRP or PDA or Stem Cells.\par \par I, Daysi Osuna, attest that this documentation has been prepared under the direction and in the presence of Provider Reid Burger DO\par The documentation recorded by the scribe, in my presence, accurately reflects the service I personally performed, and the decisions made by me with my edits as appropriate.\par \par Best Regards, \par Reid Burger, D.O. \par Diplomat, American Board of Anesthesiology \par Diplomat, American Board of Pain Medicine \par Diplomat, American Board of Pain Management

## 2022-10-24 ENCOUNTER — APPOINTMENT (OUTPATIENT)
Dept: PAIN MANAGEMENT | Facility: CLINIC | Age: 57
End: 2022-10-24

## 2022-10-27 ENCOUNTER — APPOINTMENT (OUTPATIENT)
Dept: PAIN MANAGEMENT | Facility: CLINIC | Age: 57
End: 2022-10-27

## 2022-10-27 DIAGNOSIS — M17.11 UNILATERAL PRIMARY OSTEOARTHRITIS, RIGHT KNEE: ICD-10-CM

## 2022-10-27 DIAGNOSIS — M17.12 UNILATERAL PRIMARY OSTEOARTHRITIS, LEFT KNEE: ICD-10-CM

## 2022-10-27 DIAGNOSIS — M47.818 SPONDYLOSIS W/OUT MYELOPATHY OR RADICULOPATHY, SACRAL AND SACROCOCCYGEAL REGION: ICD-10-CM

## 2022-10-27 DIAGNOSIS — M47.816 SPONDYLOSIS W/OUT MYELOPATHY OR RADICULOPATHY, LUMBAR REGION: ICD-10-CM

## 2022-10-27 DIAGNOSIS — M47.817 SPONDYLOSIS W/OUT MYELOPATHY OR RADICULOPATHY, LUMBOSACRAL REGION: ICD-10-CM

## 2022-10-27 PROCEDURE — 99213 OFFICE O/P EST LOW 20 MIN: CPT

## 2022-10-27 RX ORDER — LIDOCAINE 5 G/100G
5 OINTMENT TOPICAL EVERY 8 HOURS
Qty: 50 | Refills: 3 | Status: ACTIVE | COMMUNITY
Start: 2022-10-27 | End: 1900-01-01

## 2022-10-27 RX ORDER — DICLOFENAC SODIUM 3 G/100G
3 GEL TOPICAL TWICE DAILY
Qty: 1 | Refills: 3 | Status: ACTIVE | COMMUNITY
Start: 2022-10-27 | End: 1900-01-01

## 2022-10-31 ENCOUNTER — APPOINTMENT (OUTPATIENT)
Dept: PAIN MANAGEMENT | Facility: CLINIC | Age: 57
End: 2022-10-31

## 2022-11-01 PROBLEM — M47.816 ARTHRITIS OF FACET JOINT OF LUMBAR SPINE: Status: ACTIVE | Noted: 2022-09-27

## 2022-11-01 PROBLEM — M17.12 ARTHRITIS OF LEFT KNEE: Status: ACTIVE | Noted: 2022-09-27

## 2022-11-01 PROBLEM — M47.818 ARTHRITIS OF SACROILIAC JOINT: Status: ACTIVE | Noted: 2022-09-27

## 2022-11-01 PROBLEM — M47.817 ARTHRITIS OF LUMBOSACRAL SPINE: Status: ACTIVE | Noted: 2022-09-27

## 2022-11-01 PROBLEM — M17.11 ARTHRITIS OF RIGHT KNEE: Status: ACTIVE | Noted: 2022-09-27

## 2022-11-01 NOTE — REASON FOR VISIT
[Follow-Up Visit] : a follow-up pain management visit [FreeTextEntry2] : LOWER BACK PAIN / KNEE PAIN

## 2022-11-01 NOTE — HISTORY OF PRESENT ILLNESS
[FreeTextEntry1] : HISTORY OF PRESENT ILLNESS: This is a 57 year old man complaining of lower back and bilateral knee pain. The patient has had this pain for 34 years. The pain has worsened in the last 10 years. The pain started after no specific injury or event. Patient describes pain as severe. During the last month the pain has been constant with symptoms worse in the morning. Pain described as burning, sharp, pressure-like, dull/aching, shooting and cutting. Pain is associated with numbness and pins and needles into the lower extremities. Patient does not experience weakness. Pain is not changed with lying down, standing, sitting, walking, exercise, relaxation, coughing sneezing or bowel movements. Bowel or bladder habits have not changed.\par \par ACTIVITIES: Patient could walk 2 blocks before the pain starts. Patient can sit 20 minutes before pain starts. Patient can stand 1 hour before pain starts. The patient sometimes lays down because of pain. Patient uses no assistive walking device at this time. Patient has difficulty performing household chores, doing yard work or shopping, socializing with friends, participating in recreational activities and exercising at this time.\par \par PRIOR PAIN TREATMENTS: Moderate relief with nerve block/injection and heat treatment\par \par PRIOR PAIN MEDICATIONS: No prior pain medication\par \par PRESENTING TODAY: In revisit encounter in regard to his continued lower back and bilateral knee pain. Patient had MRIs of the lumbar spine and x-rays of bilateral knees which were reviewed in detail during today's encounter. Patient notes pain remains unchanged in severity or distribution since his last encounter. Patient does not wish to proceed with injections at this time. \par \par Covid 19 - This in-office encounter has occurred during a Public Health Emergency (PHE). As required by law, due to the risk of respiratory-transmitted infectious diseases, our office provided additional materials, supplies and clinical staff to assist in keeping our patients, physicians and office staff safe during this health emergency.

## 2022-11-01 NOTE — PHYSICAL EXAM
[de-identified] : GENERAL APPEARANCE OF PATIENT IS WELL DEVELOPED, WELL NOURISHED, BODY HABITUS NORMAL, WELL GROOMED, NO DEFORMITIES NOTED. \par Head - Atraumatic and Normocephalic \par Eyes, Nose, and Throat: External inspection of ears and nose are normal overall without scars, lesions, or masses noted. Assessment of hearing is normal\par Neck-Examination of neck shows no masses, overall appearance is normal, neck is symmetric, tracheal position is midline, no crepitus is noted. Examination of thyroid shows no enlargement, tenderness or masses\par Respiratory- Assessment of respiratory effort shows no intercostal retractions, no use of accessory muscles, unlabored breathing, and normal diaphragmatic movement.\par Cardiovascular- Examination of extremities show no edema or varicosities\par Musculoskeletal. Examination of gait is not antalgic and station is normal\par Inspection and palpation of digits and nails shows no clubbing, cyanosis, nodules, drainage, fluctuance, petechiae\par \par • Spine –right fact pain at L3-S1. Right PSIs pain and reverse Jaden’s Positive.\par \par \par • Neck- inspection and palpation shows no misalignment, asymmetry, crepitation, defects, tenderness, masses, effusions. ROM is normal without crepitation or contracture. No instability or subluxation or laxity is noted. No abnormal movements.\par \par \par • RUE- inspection and palpation shows no misalignment, asymmetry, crepitation, defects, tenderness, masses, effusions. ROM is normal without crepitation or contracture. No instability or subluxation or laxity is noted. No abnormal movements.\par \par \par • LUE- inspection and palpation shows no misalignment, asymmetry, crepitation, defects, tenderness, masses, effusions. ROM is normal without crepitation or contracture. No instability or subluxation or laxity is noted. No abnormal movements.\par \par \par • RLE- pain on the lateral aspect of the right knee.\par \par \par • LLE- inspection and palpation shows no misalignment, asymmetry, crepitation, defects, tenderness, masses, effusions. ROM is normal without crepitation or contracture. No instability or subluxation or laxity is noted. No abnormal movements.\par \par \par • Skin- Inspection of skin and subcutaneous tissue shows no rashes, lesions or ulcers. Palpation of skin and subcutaneous tissue shows no rashes, no indurations, subcutaneous nodules or tightening.\par \par \par • Abdomen- Nontender\par \par \par • Neurologic- CN 2-12 are grossly intact. No sensory or motor deficits in the upper and lower extremities. Adequate strength in upper and lower extremities \par \par \par • Psychiatric- Patient’s judgment and insight are intact. Oriented to time, place and person. Recent and remote memory intact.

## 2022-11-01 NOTE — DISCUSSION/SUMMARY
[de-identified] : Mr. Burton is a 57-year-old male with a chief complaint of lower back pain and bilateral knee pain, right greater than left. Patient had a lumbar MRI and x-rays of the bilateral knees which were reviewed in detail during today's encounter. \par \par In regard to his lower back pain, Patient is a candidate for a rigjt L3-S1 MBB, however he does not wish to proceed at this time.   \par \par In regard to his bilateral knee pain, he is a canddiate for a bilateral knee joint injection. The patient does not want to proceed with injections at this time. \par \par At this time, Patient will trial Lidocaine and Diclofenac topical to decrease his pain and help him with home rehab. He will also trial Lidoderm patches. He will follow up in 4 months for medication refill and reassessment. \par \par I, Daysi Osuna, attest that this documentation has been prepared under the direction and in the presence of Provider Reid Burger DO\par The documentation recorded by the scribe, in my presence, accurately reflects the service I personally performed, and the decisions made by me with my edits as appropriate.\par \par Best Regards, \par Reid Burger D.BRUNO. \par Diplomat, American Board of Anesthesiology \par Diplomat, American Board of Pain Medicine \par Diplomat, American Board of Pain Management

## 2022-11-07 ENCOUNTER — APPOINTMENT (OUTPATIENT)
Dept: ORTHOPEDIC SURGERY | Facility: CLINIC | Age: 57
End: 2022-11-07

## 2022-12-13 ENCOUNTER — EMERGENCY (EMERGENCY)
Facility: HOSPITAL | Age: 57
LOS: 0 days | Discharge: HOME | End: 2022-12-13
Attending: EMERGENCY MEDICINE | Admitting: EMERGENCY MEDICINE

## 2022-12-13 VITALS
HEART RATE: 81 BPM | TEMPERATURE: 97 F | WEIGHT: 199.08 LBS | OXYGEN SATURATION: 100 % | DIASTOLIC BLOOD PRESSURE: 67 MMHG | RESPIRATION RATE: 18 BRPM | SYSTOLIC BLOOD PRESSURE: 137 MMHG

## 2022-12-13 DIAGNOSIS — K57.92 DIVERTICULITIS OF INTESTINE, PART UNSPECIFIED, WITHOUT PERFORATION OR ABSCESS WITHOUT BLEEDING: ICD-10-CM

## 2022-12-13 DIAGNOSIS — M79.651 PAIN IN RIGHT THIGH: ICD-10-CM

## 2022-12-13 DIAGNOSIS — Z87.19 PERSONAL HISTORY OF OTHER DISEASES OF THE DIGESTIVE SYSTEM: ICD-10-CM

## 2022-12-13 DIAGNOSIS — R19.7 DIARRHEA, UNSPECIFIED: ICD-10-CM

## 2022-12-13 DIAGNOSIS — R10.32 LEFT LOWER QUADRANT PAIN: ICD-10-CM

## 2022-12-13 DIAGNOSIS — R50.9 FEVER, UNSPECIFIED: ICD-10-CM

## 2022-12-13 DIAGNOSIS — M25.551 PAIN IN RIGHT HIP: ICD-10-CM

## 2022-12-13 LAB
ALBUMIN SERPL ELPH-MCNC: 4.1 G/DL — SIGNIFICANT CHANGE UP (ref 3.5–5.2)
ALP SERPL-CCNC: 73 U/L — SIGNIFICANT CHANGE UP (ref 30–115)
ALT FLD-CCNC: 18 U/L — SIGNIFICANT CHANGE UP (ref 0–41)
ANION GAP SERPL CALC-SCNC: 9 MMOL/L — SIGNIFICANT CHANGE UP (ref 7–14)
APPEARANCE UR: CLEAR — SIGNIFICANT CHANGE UP
AST SERPL-CCNC: 19 U/L — SIGNIFICANT CHANGE UP (ref 0–41)
BASOPHILS # BLD AUTO: 0.08 K/UL — SIGNIFICANT CHANGE UP (ref 0–0.2)
BASOPHILS NFR BLD AUTO: 0.9 % — SIGNIFICANT CHANGE UP (ref 0–1)
BILIRUB SERPL-MCNC: 0.8 MG/DL — SIGNIFICANT CHANGE UP (ref 0.2–1.2)
BILIRUB UR-MCNC: NEGATIVE — SIGNIFICANT CHANGE UP
BUN SERPL-MCNC: 14 MG/DL — SIGNIFICANT CHANGE UP (ref 10–20)
CALCIUM SERPL-MCNC: 9.3 MG/DL — SIGNIFICANT CHANGE UP (ref 8.4–10.5)
CHLORIDE SERPL-SCNC: 98 MMOL/L — SIGNIFICANT CHANGE UP (ref 98–110)
CO2 SERPL-SCNC: 30 MMOL/L — SIGNIFICANT CHANGE UP (ref 17–32)
COLOR SPEC: YELLOW — SIGNIFICANT CHANGE UP
CREAT SERPL-MCNC: 1 MG/DL — SIGNIFICANT CHANGE UP (ref 0.7–1.5)
DIFF PNL FLD: NEGATIVE — SIGNIFICANT CHANGE UP
EGFR: 88 ML/MIN/1.73M2 — SIGNIFICANT CHANGE UP
EOSINOPHIL # BLD AUTO: 0.23 K/UL — SIGNIFICANT CHANGE UP (ref 0–0.7)
EOSINOPHIL NFR BLD AUTO: 2.5 % — SIGNIFICANT CHANGE UP (ref 0–8)
GLUCOSE SERPL-MCNC: 110 MG/DL — HIGH (ref 70–99)
GLUCOSE UR QL: NEGATIVE MG/DL — SIGNIFICANT CHANGE UP
HCT VFR BLD CALC: 41.7 % — LOW (ref 42–52)
HGB BLD-MCNC: 14.7 G/DL — SIGNIFICANT CHANGE UP (ref 14–18)
IMM GRANULOCYTES NFR BLD AUTO: 0.2 % — SIGNIFICANT CHANGE UP (ref 0.1–0.3)
KETONES UR-MCNC: NEGATIVE — SIGNIFICANT CHANGE UP
LEUKOCYTE ESTERASE UR-ACNC: NEGATIVE — SIGNIFICANT CHANGE UP
LIDOCAIN IGE QN: 33 U/L — SIGNIFICANT CHANGE UP (ref 7–60)
LYMPHOCYTES # BLD AUTO: 2.4 K/UL — SIGNIFICANT CHANGE UP (ref 1.2–3.4)
LYMPHOCYTES # BLD AUTO: 25.9 % — SIGNIFICANT CHANGE UP (ref 20.5–51.1)
MCHC RBC-ENTMCNC: 28.6 PG — SIGNIFICANT CHANGE UP (ref 27–31)
MCHC RBC-ENTMCNC: 35.3 G/DL — SIGNIFICANT CHANGE UP (ref 32–37)
MCV RBC AUTO: 81.1 FL — SIGNIFICANT CHANGE UP (ref 80–94)
MONOCYTES # BLD AUTO: 0.62 K/UL — HIGH (ref 0.1–0.6)
MONOCYTES NFR BLD AUTO: 6.7 % — SIGNIFICANT CHANGE UP (ref 1.7–9.3)
NEUTROPHILS # BLD AUTO: 5.9 K/UL — SIGNIFICANT CHANGE UP (ref 1.4–6.5)
NEUTROPHILS NFR BLD AUTO: 63.8 % — SIGNIFICANT CHANGE UP (ref 42.2–75.2)
NITRITE UR-MCNC: NEGATIVE — SIGNIFICANT CHANGE UP
NRBC # BLD: 0 /100 WBCS — SIGNIFICANT CHANGE UP (ref 0–0)
PH UR: 6.5 — SIGNIFICANT CHANGE UP (ref 5–8)
PLATELET # BLD AUTO: 217 K/UL — SIGNIFICANT CHANGE UP (ref 130–400)
POTASSIUM SERPL-MCNC: 3.6 MMOL/L — SIGNIFICANT CHANGE UP (ref 3.5–5)
POTASSIUM SERPL-SCNC: 3.6 MMOL/L — SIGNIFICANT CHANGE UP (ref 3.5–5)
PROT SERPL-MCNC: 6.9 G/DL — SIGNIFICANT CHANGE UP (ref 6–8)
PROT UR-MCNC: NEGATIVE MG/DL — SIGNIFICANT CHANGE UP
RBC # BLD: 5.14 M/UL — SIGNIFICANT CHANGE UP (ref 4.7–6.1)
RBC # FLD: 12.7 % — SIGNIFICANT CHANGE UP (ref 11.5–14.5)
SODIUM SERPL-SCNC: 137 MMOL/L — SIGNIFICANT CHANGE UP (ref 135–146)
SP GR SPEC: 1.02 — SIGNIFICANT CHANGE UP (ref 1.01–1.03)
UROBILINOGEN FLD QL: 0.2 MG/DL — SIGNIFICANT CHANGE UP
WBC # BLD: 9.25 K/UL — SIGNIFICANT CHANGE UP (ref 4.8–10.8)
WBC # FLD AUTO: 9.25 K/UL — SIGNIFICANT CHANGE UP (ref 4.8–10.8)

## 2022-12-13 PROCEDURE — 74177 CT ABD & PELVIS W/CONTRAST: CPT | Mod: 26,MA

## 2022-12-13 PROCEDURE — 73502 X-RAY EXAM HIP UNI 2-3 VIEWS: CPT | Mod: 26,RT

## 2022-12-13 PROCEDURE — 93970 EXTREMITY STUDY: CPT | Mod: 26

## 2022-12-13 PROCEDURE — 99285 EMERGENCY DEPT VISIT HI MDM: CPT

## 2022-12-13 RX ORDER — METRONIDAZOLE 500 MG
1 TABLET ORAL
Qty: 30 | Refills: 0
Start: 2022-12-13 | End: 2022-12-22

## 2022-12-13 RX ORDER — SODIUM CHLORIDE 9 MG/ML
1000 INJECTION INTRAMUSCULAR; INTRAVENOUS; SUBCUTANEOUS ONCE
Refills: 0 | Status: COMPLETED | OUTPATIENT
Start: 2022-12-13 | End: 2022-12-13

## 2022-12-13 RX ADMIN — SODIUM CHLORIDE 1000 MILLILITER(S): 9 INJECTION INTRAMUSCULAR; INTRAVENOUS; SUBCUTANEOUS at 17:25

## 2022-12-13 NOTE — ED ADULT TRIAGE NOTE - WEIGHT METHOD
Bedside and Verbal shift change report given to darnell interiano (oncoming nurse) by jesi morton (offgoing nurse). Report included the following information SBAR, Kardex and MAR.     0900 Care/assessment done. Infant on bubble CPAP, comfortable WOB, sats stable in high 90s. Significant acrocyanosis. Plan to attempt RA trial this afternoon around noon. 0930 Dad in to hold infant. Baby tolerated very well. 57 Mom in to visit for first time. Held infant, updated on progress and plan. Expressed understanding. Baby tolerated activity well. 1215 Infant taken off CPAP to room air. Few grunts initially, then settled down. Sats mid to high 90s.  1500 Infant has remained comfortable in room air. stated

## 2022-12-13 NOTE — ED PROVIDER NOTE - CARE PLAN
1 Principal Discharge DX:	Diverticulitis   Principal Discharge DX:	Diverticulitis  Secondary Diagnosis:	Right leg pain

## 2022-12-13 NOTE — ED PROVIDER NOTE - OBJECTIVE STATEMENT
58 y/o male with hx of diverticulitis presents to the ED with LLQ pain and fevers with non bloody diarrhea x 4 days. patient took left over cipro and flagyl x 3 tablets. patient denies any dysuria or gross hematuria. patient denies any back pain. no vomiting or diarrhea. no heavy lifting. no testicular pain or swelling. patient c/o gnawing pain

## 2022-12-13 NOTE — ED PROVIDER NOTE - ATTENDING APP SHARED VISIT CONTRIBUTION OF CARE
57yM hx diverticulitis p/w LLQ abd pain x 4d similar to prior episodes of diverticulitis.  No fever, n/v/d.  Has not seen GI or had recent colonoscopy.  Says he had some "pills" leftover from last episode of diverticulitis, which he took on day 1-2 of sx onset, but he ran out and sx have worsened.  Also notes R hip/thigh pain and says he took a trip to [place] over the weekend, during which time the LLQ pain started.  He assumed the R hip/thigh pain was related to sitting differently to try to minimize his abd pain, but wife at bedside points out he was essentially driving straight for 2.5 days.  No CP, SOB.

## 2022-12-13 NOTE — ED PROVIDER NOTE - PATIENT PORTAL LINK FT
You can access the FollowMyHealth Patient Portal offered by [facility] by registering at the following website: [email or web address]. By joining [facility]’s FollowMyHealth portal, you will also be able to view your health information using other applications (apps) compatible with our system.

## 2022-12-13 NOTE — ED PROVIDER NOTE - CLINICAL SUMMARY MEDICAL DECISION MAKING FREE TEXT BOX
57yM hx diverticulitis p/w LLQ abd pain similar to prior diverticulitis.  Pt nontoxic appearing and abd soft/benign despite discomfort.  Labs reassuring.  UA w/o UTI.  CT c/w uncomplicated diverticulitis.  Xray w/o fx or dislocation in R hip/prox femur.  US prelim w/o DVT.  Recommend supportive care including PO abx for diverticulitis and nonpharmacologic treatment or OTC meds for presumed R hip/back/femur strain, o/p f/u, return precautions.

## 2022-12-13 NOTE — ED PROVIDER NOTE - NSFOLLOWUPINSTRUCTIONS_ED_ALL_ED_FT
Our Emergency Department Referral Coordinators will be reaching out ot you in the next 24-48 hours from 9:00am to 5:00pm (Monday to Friday) with a follow up appointment. Please expect a phone call from the hospital in that time frame. If you do not receive a call or if you have any questions or concerns, you can reach them at [phone] or [phone].        Diverticulitis    Diverticulitis is inflammation or infection of small pouches in your colon that form when you HAVE a condition called diverticulosis. This condition can range from mild to severe potentially leading to perforation or obstructions of your colon. Symptoms include abdominal pain, fever/chills, nausea, vomiting, diarrhea, constipation, or blood in your stool. If you were prescribed an antibiotic medicine, take it as told by your health care provider. Do not stop taking the antibiotic even if you start to feel better.    SEEK IMMEDIATE MEDICAL CARE IF YOU HAVE ANY OF THE FOLLOWING SYMPTOMS: worsening abdominal pain, high fever, inability to hold down liquids or medication, black or bloody stools, inability to pass gas, lightheadedness/dizziness, or a change in mental status.

## 2022-12-13 NOTE — ED PROVIDER NOTE - NS ED ROS FT
Review of Systems    Constitutional: (+) fever/ chills (-)loss of appetite or  weight loss  Eyes (-) visual changes  ENT: (-) epistaxis (-) sore throat (-) ear pain  Cardiovascular: (-) chest pain, (-) syncope (-) palpitations  Respiratory: (-) cough, (-) shortness of breath  Gastrointestinal: (-) vomiting, (-) diarrhea (+) abdominal pain  : (-) dysuria , hematuria   Musculoskeletal:  (-) back pain, (-) joint pain   Integumentary: (-) rash, (-) swelling  Neurological: (-) headache, (-) altered mental status

## 2022-12-13 NOTE — ED PROVIDER NOTE - PHYSICAL EXAMINATION
Vital Signs: I have reviewed the initial vital signs.  Constitutional: well-nourished, no acute distress, normocephalic  Eyes: PERRLA, EOMI,  clear conjunctiva  ENT: MMM  Cardiovascular: regular rate, regular rhythm, no murmur appreciated  Respiratory: unlabored respiratory effort, clear to auscultation bilaterally  Gastrointestinal: soft, LUQ, LLQ TTP, no rebound, no cva tenderness,m non-distended  abdomen, no pulsatile mass  Musculoskeletal: supple neck, no lower extremity edema, no bony tenderness  Integumentary: warm, dry, no rash  Neurologic: awake, alert, cranial nerves II-XII grossly intact, extremities’ motor and sensory functions grossly intact, no focal deficits, GCS 15

## 2023-01-16 NOTE — ED ADULT NURSE NOTE - NS PRO PASSIVE SMOKE EXP
Pediatric Medications for Fever and Pain Relief:    Acetaminophen (Tylenol and other brands):   Appropriate acetaminophen dose for your child's weight.   Doses may be given every 4 hours as needed no more than 5 times per day.       Your child's weight   Children's Liquid Solution and Suspension (160 mg per 5 mL) Children's Chewable and Disintegrating Tablets (1 tablet = 80 mg) Jr. Strength Chewable and Disintegrating Tablets (1 tablet = 160 mg)   6-11 pounds 1.25 mL Not recommended Not recommended   12-17 pounds 2.5 mL Not recommended Not recommended   18-23 pounds 3.75 mL Not recommended Not recommended   24-35 pounds 5 mL 2 tablets 1 tablet   36-47 pounds 7.5 mL 3 tablets 1-1/2 tablets   48-59 pounds 10 mL 4 tablets 2 tablets   60-71 pounds 12.5 mL 5 tablets 2-1/2 tablets   72-95 pounds 15 mL 6 tablets 3 tablets   More than 96 pounds Not recommended Not recommended 4 tablets       Pediatric Medications for Fever and Pain Relief:    Ibuprofen (Motrin, Advil and other brands):   Appropriate ibuprofen dose for your child's weight.   Not recommended for infants under 6 months of age.  Doses may be given every 6 hours as needed.       Your child's weight Infant Oral Suspension (50 mg per 1.25 mL)   12-17 pounds 50 mg (1.25 ml)   18-23 pounds 75 mg (1.875 ml)         Your child's weight Children's Oral Suspension (100 mg per 5 mL)   50 mg Chewable Tablet   100 mg Chewable Tablet   12-17 pounds 2.5 mL Not recommended Not recommended   18-23 pounds 3.75 mL Not recommended Not recommended   24-35 pounds 5 mL 2 tablets 1 tablet   36-47 pounds 7.5 mL 3 tablets 1-1/2 tablets   48-59 pounds 10 mL 4 tablets 2 tablets   60-71 pounds 12.5 mL 5 tablets 2-1/2 tablets   72-95 pounds 15 mL 6 tablets 3 tablets       
Unknown

## 2023-02-27 ENCOUNTER — APPOINTMENT (OUTPATIENT)
Dept: PAIN MANAGEMENT | Facility: CLINIC | Age: 58
End: 2023-02-27

## 2023-09-07 ENCOUNTER — EMERGENCY (EMERGENCY)
Facility: HOSPITAL | Age: 58
LOS: 0 days | Discharge: HOME | End: 2023-09-07
Attending: EMERGENCY MEDICINE
Payer: COMMERCIAL

## 2023-09-07 VITALS
WEIGHT: 199.96 LBS | DIASTOLIC BLOOD PRESSURE: 81 MMHG | HEIGHT: 70 IN | RESPIRATION RATE: 18 BRPM | HEART RATE: 87 BPM | SYSTOLIC BLOOD PRESSURE: 142 MMHG | TEMPERATURE: 100 F | OXYGEN SATURATION: 100 %

## 2023-09-07 DIAGNOSIS — R11.0 NAUSEA: ICD-10-CM

## 2023-09-07 DIAGNOSIS — M54.9 DORSALGIA, UNSPECIFIED: ICD-10-CM

## 2023-09-07 DIAGNOSIS — G89.29 OTHER CHRONIC PAIN: ICD-10-CM

## 2023-09-07 DIAGNOSIS — R10.32 LEFT LOWER QUADRANT PAIN: ICD-10-CM

## 2023-09-07 DIAGNOSIS — R19.7 DIARRHEA, UNSPECIFIED: ICD-10-CM

## 2023-09-07 DIAGNOSIS — Z87.19 PERSONAL HISTORY OF OTHER DISEASES OF THE DIGESTIVE SYSTEM: ICD-10-CM

## 2023-09-07 DIAGNOSIS — K57.92 DIVERTICULITIS OF INTESTINE, PART UNSPECIFIED, WITHOUT PERFORATION OR ABSCESS WITHOUT BLEEDING: ICD-10-CM

## 2023-09-07 LAB
ALBUMIN SERPL ELPH-MCNC: 4.8 G/DL — SIGNIFICANT CHANGE UP (ref 3.5–5.2)
ALP SERPL-CCNC: 88 U/L — SIGNIFICANT CHANGE UP (ref 30–115)
ALT FLD-CCNC: 17 U/L — SIGNIFICANT CHANGE UP (ref 0–41)
ANION GAP SERPL CALC-SCNC: 16 MMOL/L — HIGH (ref 7–14)
APPEARANCE UR: CLEAR — SIGNIFICANT CHANGE UP
AST SERPL-CCNC: 19 U/L — SIGNIFICANT CHANGE UP (ref 0–41)
BASOPHILS # BLD AUTO: 0.08 K/UL — SIGNIFICANT CHANGE UP (ref 0–0.2)
BASOPHILS NFR BLD AUTO: 0.6 % — SIGNIFICANT CHANGE UP (ref 0–1)
BILIRUB DIRECT SERPL-MCNC: 0.2 MG/DL — SIGNIFICANT CHANGE UP (ref 0–0.3)
BILIRUB INDIRECT FLD-MCNC: 1.1 MG/DL — SIGNIFICANT CHANGE UP (ref 0.2–1.2)
BILIRUB SERPL-MCNC: 1.3 MG/DL — HIGH (ref 0.2–1.2)
BILIRUB UR-MCNC: NEGATIVE — SIGNIFICANT CHANGE UP
BUN SERPL-MCNC: 12 MG/DL — SIGNIFICANT CHANGE UP (ref 10–20)
CALCIUM SERPL-MCNC: 9.8 MG/DL — SIGNIFICANT CHANGE UP (ref 8.4–10.5)
CHLORIDE SERPL-SCNC: 97 MMOL/L — LOW (ref 98–110)
CO2 SERPL-SCNC: 27 MMOL/L — SIGNIFICANT CHANGE UP (ref 17–32)
COLOR SPEC: YELLOW — SIGNIFICANT CHANGE UP
CREAT SERPL-MCNC: 1.1 MG/DL — SIGNIFICANT CHANGE UP (ref 0.7–1.5)
DIFF PNL FLD: NEGATIVE — SIGNIFICANT CHANGE UP
EGFR: 78 ML/MIN/1.73M2 — SIGNIFICANT CHANGE UP
EOSINOPHIL # BLD AUTO: 0.12 K/UL — SIGNIFICANT CHANGE UP (ref 0–0.7)
EOSINOPHIL NFR BLD AUTO: 0.9 % — SIGNIFICANT CHANGE UP (ref 0–8)
GLUCOSE SERPL-MCNC: 104 MG/DL — HIGH (ref 70–99)
GLUCOSE UR QL: NEGATIVE MG/DL — SIGNIFICANT CHANGE UP
HCT VFR BLD CALC: 45.6 % — SIGNIFICANT CHANGE UP (ref 42–52)
HGB BLD-MCNC: 16 G/DL — SIGNIFICANT CHANGE UP (ref 14–18)
IMM GRANULOCYTES NFR BLD AUTO: 0.2 % — SIGNIFICANT CHANGE UP (ref 0.1–0.3)
KETONES UR-MCNC: NEGATIVE MG/DL — SIGNIFICANT CHANGE UP
LACTATE SERPL-SCNC: 1.9 MMOL/L — SIGNIFICANT CHANGE UP (ref 0.7–2)
LEUKOCYTE ESTERASE UR-ACNC: NEGATIVE — SIGNIFICANT CHANGE UP
LIDOCAIN IGE QN: 25 U/L — SIGNIFICANT CHANGE UP (ref 7–60)
LYMPHOCYTES # BLD AUTO: 16 % — LOW (ref 20.5–51.1)
LYMPHOCYTES # BLD AUTO: 2.12 K/UL — SIGNIFICANT CHANGE UP (ref 1.2–3.4)
MCHC RBC-ENTMCNC: 28.4 PG — SIGNIFICANT CHANGE UP (ref 27–31)
MCHC RBC-ENTMCNC: 35.1 G/DL — SIGNIFICANT CHANGE UP (ref 32–37)
MCV RBC AUTO: 80.9 FL — SIGNIFICANT CHANGE UP (ref 80–94)
MONOCYTES # BLD AUTO: 0.81 K/UL — HIGH (ref 0.1–0.6)
MONOCYTES NFR BLD AUTO: 6.1 % — SIGNIFICANT CHANGE UP (ref 1.7–9.3)
NEUTROPHILS # BLD AUTO: 10.05 K/UL — HIGH (ref 1.4–6.5)
NEUTROPHILS NFR BLD AUTO: 76.2 % — HIGH (ref 42.2–75.2)
NITRITE UR-MCNC: NEGATIVE — SIGNIFICANT CHANGE UP
NRBC # BLD: 0 /100 WBCS — SIGNIFICANT CHANGE UP (ref 0–0)
PH UR: 6 — SIGNIFICANT CHANGE UP (ref 5–8)
PLATELET # BLD AUTO: 228 K/UL — SIGNIFICANT CHANGE UP (ref 130–400)
PMV BLD: 12.6 FL — HIGH (ref 7.4–10.4)
POTASSIUM SERPL-MCNC: 3.6 MMOL/L — SIGNIFICANT CHANGE UP (ref 3.5–5)
POTASSIUM SERPL-SCNC: 3.6 MMOL/L — SIGNIFICANT CHANGE UP (ref 3.5–5)
PROT SERPL-MCNC: 7.5 G/DL — SIGNIFICANT CHANGE UP (ref 6–8)
PROT UR-MCNC: SIGNIFICANT CHANGE UP MG/DL
RBC # BLD: 5.64 M/UL — SIGNIFICANT CHANGE UP (ref 4.7–6.1)
RBC # FLD: 12.9 % — SIGNIFICANT CHANGE UP (ref 11.5–14.5)
SODIUM SERPL-SCNC: 140 MMOL/L — SIGNIFICANT CHANGE UP (ref 135–146)
SP GR SPEC: >1.03 — HIGH (ref 1–1.03)
UROBILINOGEN FLD QL: 1 MG/DL — SIGNIFICANT CHANGE UP (ref 0.2–1)
WBC # BLD: 13.21 K/UL — HIGH (ref 4.8–10.8)
WBC # FLD AUTO: 13.21 K/UL — HIGH (ref 4.8–10.8)

## 2023-09-07 PROCEDURE — G1004: CPT

## 2023-09-07 PROCEDURE — 83605 ASSAY OF LACTIC ACID: CPT

## 2023-09-07 PROCEDURE — 87086 URINE CULTURE/COLONY COUNT: CPT

## 2023-09-07 PROCEDURE — 96374 THER/PROPH/DIAG INJ IV PUSH: CPT | Mod: XU

## 2023-09-07 PROCEDURE — 74177 CT ABD & PELVIS W/CONTRAST: CPT | Mod: MG

## 2023-09-07 PROCEDURE — 99285 EMERGENCY DEPT VISIT HI MDM: CPT

## 2023-09-07 PROCEDURE — 83690 ASSAY OF LIPASE: CPT

## 2023-09-07 PROCEDURE — 99284 EMERGENCY DEPT VISIT MOD MDM: CPT | Mod: 25

## 2023-09-07 PROCEDURE — 80076 HEPATIC FUNCTION PANEL: CPT

## 2023-09-07 PROCEDURE — 80048 BASIC METABOLIC PNL TOTAL CA: CPT

## 2023-09-07 PROCEDURE — 81003 URINALYSIS AUTO W/O SCOPE: CPT

## 2023-09-07 PROCEDURE — 85025 COMPLETE CBC W/AUTO DIFF WBC: CPT

## 2023-09-07 PROCEDURE — 74177 CT ABD & PELVIS W/CONTRAST: CPT | Mod: 26,MG

## 2023-09-07 PROCEDURE — 36415 COLL VENOUS BLD VENIPUNCTURE: CPT

## 2023-09-07 RX ORDER — CIPROFLOXACIN LACTATE 400MG/40ML
500 VIAL (ML) INTRAVENOUS ONCE
Refills: 0 | Status: COMPLETED | OUTPATIENT
Start: 2023-09-07 | End: 2023-09-07

## 2023-09-07 RX ORDER — CIPROFLOXACIN LACTATE 400MG/40ML
1 VIAL (ML) INTRAVENOUS
Qty: 14 | Refills: 0
Start: 2023-09-07 | End: 2023-09-13

## 2023-09-07 RX ORDER — MORPHINE SULFATE 50 MG/1
4 CAPSULE, EXTENDED RELEASE ORAL ONCE
Refills: 0 | Status: DISCONTINUED | OUTPATIENT
Start: 2023-09-07 | End: 2023-09-07

## 2023-09-07 RX ORDER — METRONIDAZOLE 500 MG
500 TABLET ORAL ONCE
Refills: 0 | Status: DISCONTINUED | OUTPATIENT
Start: 2023-09-07 | End: 2023-09-07

## 2023-09-07 RX ORDER — SODIUM CHLORIDE 9 MG/ML
1000 INJECTION INTRAMUSCULAR; INTRAVENOUS; SUBCUTANEOUS ONCE
Refills: 0 | Status: COMPLETED | OUTPATIENT
Start: 2023-09-07 | End: 2023-09-07

## 2023-09-07 RX ORDER — METRONIDAZOLE 500 MG
500 TABLET ORAL ONCE
Refills: 0 | Status: COMPLETED | OUTPATIENT
Start: 2023-09-07 | End: 2023-09-07

## 2023-09-07 RX ORDER — METRONIDAZOLE 500 MG
1 TABLET ORAL
Qty: 21 | Refills: 0
Start: 2023-09-07 | End: 2023-09-13

## 2023-09-07 RX ADMIN — MORPHINE SULFATE 4 MILLIGRAM(S): 50 CAPSULE, EXTENDED RELEASE ORAL at 19:41

## 2023-09-07 RX ADMIN — SODIUM CHLORIDE 1000 MILLILITER(S): 9 INJECTION INTRAMUSCULAR; INTRAVENOUS; SUBCUTANEOUS at 19:40

## 2023-09-07 RX ADMIN — Medication 500 MILLIGRAM(S): at 22:19

## 2023-09-07 RX ADMIN — Medication 500 MILLIGRAM(S): at 22:21

## 2023-09-07 NOTE — ED PROVIDER NOTE - PATIENT PORTAL LINK FT
You can access the FollowMyHealth Patient Portal offered by [facility] by registering at the following website: [email or web address]. By joining [facility]’s FollowMyHealth portal, you will also be able to view your health information using other applications (apps) compatible with our system.

## 2023-09-07 NOTE — ED ADULT NURSE NOTE - NSFALLRISKASMTTYPE_ED_ALL_ED
Initial (On Arrival)
Patient requests all Lab, Cardiology, and Radiology Results on their Discharge Instructions

## 2023-09-07 NOTE — ED PROVIDER NOTE - NSFOLLOWUPINSTRUCTIONS_ED_ALL_ED_FT
Our Emergency Department Referral Coordinators will be reaching out to you in the next 24-48 hours from 9:00am to 5:00pm with a follow up appointment. Please expect a phone call from the hospital in that time frame. If you do not receive a call or if you have any questions or concerns, you can reach them at   [phone]

## 2023-09-07 NOTE — ED PROVIDER NOTE - PROGRESS NOTE DETAILS
Patient being treated for diverticulitis with antibiotics, given first dose here, made aware of incidental CT findings and will follow-up with GI.  Patient placed in GI referral.

## 2023-09-07 NOTE — ED PROVIDER NOTE - OBJECTIVE STATEMENT
Patient is a 58-year-old male past history of reticulitis, chronic back pain here for evaluation of left lower quad abdominal pain x1 day with associated nausea and 1 episode of diarrhea.  Patient denies vomiting, fever, chills, chest pain, shortness of breath

## 2023-09-07 NOTE — ED ADULT NURSE NOTE - NSFALLUNIVINTERV_ED_ALL_ED
Bed/Stretcher in lowest position, wheels locked, appropriate side rails in place/Call bell, personal items and telephone in reach/Instruct patient to call for assistance before getting out of bed/chair/stretcher/Non-slip footwear applied when patient is off stretcher/[place] to call system/Physically safe environment - no spills, clutter or unnecessary equipment/Purposeful proactive rounding/Room/bathroom lighting operational, light cord in reach

## 2023-09-07 NOTE — ED PROVIDER NOTE - ATTENDING APP SHARED VISIT CONTRIBUTION OF CARE
58yM hx diverticulitis p/w L sided abd pain since yesterday.  No fever.  +nausea w/o diarrhea.  No dysuria or hematuria.

## 2023-09-07 NOTE — ED PROVIDER NOTE - PHYSICAL EXAMINATION
VITAL SIGNS: I have reviewed nursing notes and confirm.  CONSTITUTIONAL: Well-developed; well-nourished; in no acute distress.   SKIN: skin exam is warm and dry, no acute rash.    HEAD: Normocephalic; atraumatic.  EYES conjunctiva and sclera clear.  ENT: No nasal discharge; airway clear.  CARD: S1, S2 normal; no murmurs, gallops, or rubs. Regular rate and rhythm.   RESP: No wheezes, rales or rhonchi.  ABD: TTP LLQ Normal bowel sounds; soft; non-distended  EXT: Normal ROM.  No clubbing, cyanosis or edema.   NEURO: Alert, oriented, grossly unremarkable

## 2023-09-07 NOTE — ED PROVIDER NOTE - CLINICAL SUMMARY MEDICAL DECISION MAKING FREE TEXT BOX
58yM hx diverticulitis p/w L sided abd pain since yesterday.  Pt uncomfortable appearing but hemodynamically stable and abd soft/benign.  Labs w/ mild leukocytosis.  CT c/w uncomplicated diverticulitis but also small bowel wall thickening and distended stomach - possibly inflammatory vs infiltrative for bowel wall.  Recommend supportive care, PO abx, close o/p GI f/u for likely scope, return precautions.

## 2023-09-09 LAB
CULTURE RESULTS: NO GROWTH — SIGNIFICANT CHANGE UP
SPECIMEN SOURCE: SIGNIFICANT CHANGE UP

## 2023-09-29 NOTE — ED ADULT NURSE NOTE - NSFALLRSKPASTHIST_ED_ALL_ED
no

## 2023-10-24 PROBLEM — M54.9 DORSALGIA, UNSPECIFIED: Chronic | Status: ACTIVE | Noted: 2023-09-07

## 2023-10-24 PROBLEM — Z87.19 PERSONAL HISTORY OF OTHER DISEASES OF THE DIGESTIVE SYSTEM: Chronic | Status: ACTIVE | Noted: 2023-09-07

## 2023-10-26 ENCOUNTER — APPOINTMENT (OUTPATIENT)
Dept: OTOLARYNGOLOGY | Facility: CLINIC | Age: 58
End: 2023-10-26
Payer: COMMERCIAL

## 2023-10-26 DIAGNOSIS — H61.329: ICD-10-CM

## 2023-10-26 DIAGNOSIS — K21.9 GASTRO-ESOPHAGEAL REFLUX DISEASE W/OUT ESOPHAGITIS: ICD-10-CM

## 2023-10-26 DIAGNOSIS — H60.509 UNSPECIFIED ACUTE NONINFECTIVE OTITIS EXTERNA, UNSPECIFIED EAR: ICD-10-CM

## 2023-10-26 DIAGNOSIS — R13.10 DYSPHAGIA, UNSPECIFIED: ICD-10-CM

## 2023-10-26 PROCEDURE — 99204 OFFICE O/P NEW MOD 45 MIN: CPT | Mod: 25

## 2023-10-26 RX ORDER — CIPROFLOXACIN AND DEXAMETHASONE 3; 1 MG/ML; MG/ML
0.3-0.1 SUSPENSION/ DROPS AURICULAR (OTIC) TWICE DAILY
Qty: 1 | Refills: 0 | Status: ACTIVE | COMMUNITY
Start: 2023-10-26 | End: 1900-01-01

## 2023-10-26 RX ORDER — FAMOTIDINE 40 MG/1
40 TABLET, FILM COATED ORAL
Qty: 30 | Refills: 3 | Status: ACTIVE | COMMUNITY
Start: 2023-10-26 | End: 1900-01-01

## 2023-11-08 ENCOUNTER — APPOINTMENT (OUTPATIENT)
Dept: OTOLARYNGOLOGY | Facility: CLINIC | Age: 58
End: 2023-11-08
Payer: COMMERCIAL

## 2023-11-08 DIAGNOSIS — H92.02 OTALGIA, LEFT EAR: ICD-10-CM

## 2023-11-08 DIAGNOSIS — H60.312 DIFFUSE OTITIS EXTERNA, LEFT EAR: ICD-10-CM

## 2023-11-08 PROCEDURE — 99213 OFFICE O/P EST LOW 20 MIN: CPT

## 2024-09-03 NOTE — SBIRT NOTE. - NSSBIRTSERVICES_GEN_A_ED_FT
Provided SBIRT services:  Full Screen Positive. Brief Intervention Performed.  Screening results were reviewed with the patient and patient was provided information about healthy guidelines and potential negative consequences associated with level of risk. Motivation and readiness to reduce or stop use was discussed and goals and activities to make changes were suggested/offered.    AUDIT Score: 8  DAST-10 Score: 1  Duration = # 15 Minutes
no

## 2025-02-11 NOTE — ED ADULT TRIAGE NOTE - IDEAL BODY WEIGHT(KG)
Render In Strict Bullet Format?: No Continue Regimen: triamcinolone acetonide 0.1 % topical cream Detail Level: Zone 73